# Patient Record
Sex: FEMALE | Race: WHITE | NOT HISPANIC OR LATINO | Employment: FULL TIME | URBAN - METROPOLITAN AREA
[De-identification: names, ages, dates, MRNs, and addresses within clinical notes are randomized per-mention and may not be internally consistent; named-entity substitution may affect disease eponyms.]

---

## 2017-04-19 ENCOUNTER — OFFICE VISIT (OUTPATIENT)
Dept: FAMILY MEDICINE | Facility: CLINIC | Age: 42
End: 2017-04-19
Payer: COMMERCIAL

## 2017-04-19 VITALS
OXYGEN SATURATION: 99 % | DIASTOLIC BLOOD PRESSURE: 68 MMHG | HEIGHT: 65 IN | WEIGHT: 116.38 LBS | TEMPERATURE: 98 F | BODY MASS INDEX: 19.39 KG/M2 | SYSTOLIC BLOOD PRESSURE: 110 MMHG | HEART RATE: 65 BPM

## 2017-04-19 DIAGNOSIS — J30.89 NON-SEASONAL ALLERGIC RHINITIS DUE TO OTHER ALLERGIC TRIGGER: Primary | ICD-10-CM

## 2017-04-19 PROCEDURE — 99213 OFFICE O/P EST LOW 20 MIN: CPT | Mod: S$GLB,,, | Performed by: NURSE PRACTITIONER

## 2017-04-19 PROCEDURE — 1160F RVW MEDS BY RX/DR IN RCRD: CPT | Mod: S$GLB,,, | Performed by: NURSE PRACTITIONER

## 2017-04-19 PROCEDURE — 96372 THER/PROPH/DIAG INJ SC/IM: CPT | Mod: S$GLB,,, | Performed by: NURSE PRACTITIONER

## 2017-04-19 RX ORDER — TRIAMCINOLONE ACETONIDE 40 MG/ML
80 INJECTION, SUSPENSION INTRA-ARTICULAR; INTRAMUSCULAR
Status: COMPLETED | OUTPATIENT
Start: 2017-04-19 | End: 2017-04-19

## 2017-04-19 RX ADMIN — TRIAMCINOLONE ACETONIDE 80 MG: 40 INJECTION, SUSPENSION INTRA-ARTICULAR; INTRAMUSCULAR at 01:04

## 2017-04-19 NOTE — PROGRESS NOTES
Subjective:       Patient ID: Geraldine Wynne is a 41 y.o. female.    Chief Complaint: Chest Congestion and Cough    Cough   This is a new problem. The current episode started 1 to 4 weeks ago. The problem has been unchanged. The problem occurs every few minutes. The cough is productive of sputum. Associated symptoms include shortness of breath. Pertinent negatives include no chest pain, chills, ear congestion, ear pain, fever, headaches, hemoptysis, myalgias, nasal congestion, postnasal drip, rash, rhinorrhea, sore throat, sweats, weight loss or wheezing. Nothing aggravates the symptoms. She has tried rest for the symptoms. The treatment provided no relief.     Review of Systems   Constitutional: Negative for chills, fever and weight loss.   HENT: Negative for congestion, ear pain, postnasal drip, rhinorrhea, sinus pressure and sore throat.    Respiratory: Positive for cough, chest tightness and shortness of breath. Negative for hemoptysis and wheezing.    Cardiovascular: Negative for chest pain.   Gastrointestinal: Negative for constipation, diarrhea, nausea and vomiting.   Genitourinary: Negative for difficulty urinating and dysuria.   Musculoskeletal: Negative for myalgias.   Skin: Negative for rash.   Neurological: Negative for light-headedness and headaches.       Objective:      Physical Exam   Constitutional: She is oriented to person, place, and time. She appears well-developed and well-nourished.   HENT:   Head: Normocephalic and atraumatic.   Right Ear: External ear normal.   Left Ear: External ear normal.   Nose: Mucosal edema and rhinorrhea present.   Mouth/Throat: Oropharynx is clear and moist.   Neck: Neck supple.   Cardiovascular: Normal rate and regular rhythm.    Pulmonary/Chest: Effort normal and breath sounds normal.   Lymphadenopathy:     She has no cervical adenopathy.   Neurological: She is alert and oriented to person, place, and time.   Skin: Skin is warm and dry.   Psychiatric: She has a  normal mood and affect. Her behavior is normal. Judgment and thought content normal.   Vitals reviewed.      Assessment:       1. Non-seasonal allergic rhinitis due to other allergic trigger        Plan:       Non-seasonal allergic rhinitis due to other allergic trigger  -     triamcinolone acetonide injection 80 mg; Inject 2 mLs (80 mg total) into the muscle one time.    Regular Mucinex twice a day  Increase H2O intake

## 2017-04-19 NOTE — MR AVS SNAPSHOT
"    [facility]  [address] [place] [id number]  Phone: [phone]  Fax: [phone]                  Geraldine Wynne   2017 1:00 PM   Office Visit    Description:  Female : 1975   Provider:  Rocio Leary NP   Department:  [facility]           Reason for Visit     Chest Congestion     Cough           Diagnoses this Visit        Comments    Non-seasonal allergic rhinitis due to other allergic trigger    -  Primary            To Do List           Goals (5 Years of Data)     None      [facility]     [facility] On Call Nurse Care Line -  Assistance  Unless otherwise directed by your provider, please contact [facility], our nurse care line that is available for  assistance.     Registered nurses in the [facility] On Call Center provide: appointment scheduling, clinical advisement, health education, and other advisory services.  Call: [phone] (toll free)               Medications           Message regarding Medications     Verify the changes and/or additions to your medication regime listed below are the same as discussed with your clinician today.  If any of these changes or additions are incorrect, please notify your healthcare provider.        These medications were administered today        Dose Freq    triamcinolone acetonide injection 80 mg 80 mg Clinic/HOD 1 time    Sig: Inject 2 mLs (80 mg total) into the muscle one time.    Class: Normal    Route: Intramuscular           Verify that the below list of medications is an accurate representation of the medications you are currently taking.  If none reported, the list may be blank. If incorrect, please contact your healthcare provider. Carry this list with you in case of emergency.                Clinical Reference Information           Your Vitals Were     BP Pulse Temp Height Weight SpO2    110/68 65 98.3 °F (36.8 °C) (Oral) 5' 5" (1.651 m) 52.8 kg (116 lb 6.5 oz) 99%    BMI                " 19.37 kg/m2          Blood Pressure          Most Recent Value    BP  110/68      Allergies as of 4/19/2017     No Known Allergies      Immunizations Administered on Date of Encounter - 4/19/2017     None      Administrations This Visit     triamcinolone acetonide injection 80 mg     Admin Date Action Dose Route Administered By             04/19/2017 Given 80 mg Intramuscular Abilio Davila LPN                      Language Assistance Services     ATTENTION: Language assistance services are available, free of charge. Please call [phone].      ATENCIÓN: Si eleno mina, tiene a hunt disposición servicios gratuitos de asistencia lingüística. Llame al [phone].     CHÚ Ý: N?u b?n nói Ti?ng Vi?t, có các d?ch v? h? tr? ngôn ng? mi?n phí dành cho b?n. G?i s? [phone].         [facility] complies with applicable Federal civil rights laws and does not discriminate on the basis of race, color, national origin, age, disability, or sex.

## 2017-05-31 ENCOUNTER — OFFICE VISIT (OUTPATIENT)
Dept: DERMATOLOGY | Facility: CLINIC | Age: 42
End: 2017-05-31
Payer: COMMERCIAL

## 2017-05-31 DIAGNOSIS — L23.9 ALLERGIC CONTACT DERMATITIS, UNSPECIFIED TRIGGER: Primary | ICD-10-CM

## 2017-05-31 PROCEDURE — 99999 PR PBB SHADOW E&M-EST. PATIENT-LVL II: CPT | Mod: PBBFAC,,, | Performed by: DERMATOLOGY

## 2017-05-31 PROCEDURE — 99203 OFFICE O/P NEW LOW 30 MIN: CPT | Mod: S$GLB,,, | Performed by: DERMATOLOGY

## 2017-05-31 RX ORDER — DESOXIMETASONE 2.5 MG/ML
SPRAY TOPICAL
Qty: 100 ML | Refills: 3 | Status: SHIPPED | OUTPATIENT
Start: 2017-05-31 | End: 2017-06-26 | Stop reason: SDUPTHER

## 2017-05-31 NOTE — PROGRESS NOTES
Subjective:       Patient ID:  Geraldine Wynne is a 41 y.o. female who presents for   Chief Complaint   Patient presents with    Rash     Pt presents for rash that started Saturday on right knee and left elbow.  Spread to left hip left arm, left ankle. slightly itchy. Made worse by scratching.  tx with OTC lotromin cream.  No change.   Now has rash on left hip.  Has never had this rash before. Pt was on a cruise in [place] and came home Monday may 22 and rash started may 27.  Pt using homemade soap from her relative.        Rash         Review of Systems   Constitutional: Negative for fever, chills, fatigue and malaise.   HENT: Negative for lip swelling and tongue swelling.    Skin: Positive for itching and rash.   Allergic/Immunologic: Negative for environmental allergies.        Objective:    Physical Exam   Skin:   Areas Examined (abnormalities noted in diagram):   Head / Face Inspection Performed  Neck Inspection Performed  Chest / Axilla Inspection Performed  Abdomen Inspection Performed  Back Inspection Performed  RUE Inspected  LUE Inspection Performed  RLE Inspected  LLE Inspection Performed  Nails and Digits Inspection Performed              Diagram Legend     Erythematous scaling macule/papule c/w actinic keratosis       Vascular papule c/w angioma      Pigmented verrucoid papule/plaque c/w seborrheic keratosis      Yellow umbilicated papule c/w sebaceous hyperplasia      Irregularly shaped tan macule c/w lentigo     1-2 mm smooth white papules consistent with Milia      Movable subcutaneous cyst with punctum c/w epidermal inclusion cyst      Subcutaneous movable cyst c/w pilar cyst      Firm pink to brown papule c/w dermatofibroma      Pedunculated fleshy papule(s) c/w skin tag(s)      Evenly pigmented macule c/w junctional nevus     Mildly variegated pigmented, slightly irregular-bordered macule c/w mildly atypical nevus      Flesh colored to evenly pigmented papule c/w intradermal nevus       Pink pearly  papule/plaque c/w basal cell carcinoma      Erythematous hyperkeratotic cursted plaque c/w SCC      Surgical scar with no sign of skin cancer recurrence      Open and closed comedones      Inflammatory papules and pustules      Verrucoid papule consistent consistent with wart     Erythematous eczematous patches and plaques     Dystrophic onycholytic nail with subungual debris c/w onychomycosis     Umbilicated papule    Erythematous-base heme-crusted tan verrucoid plaque consistent with inflamed seborrheic keratosis     Erythematous Silvery Scaling Plaque c/w Psoriasis     See annotation      Assessment / Plan:        Allergic contact dermatitis, unspecified trigger  Wash dog and have dog avoid running outside  Topicort spray   If pt rash progresses will do systemic pred taper starting at 40 mg   Pt deferred oral pred today   Other orders  -     TOPICORT 0.25 % Spry; AAA bid  Dispense: 100 mL; Refill: 3             Return if symptoms worsen or fail to improve.

## 2017-06-08 ENCOUNTER — TELEPHONE (OUTPATIENT)
Dept: DERMATOLOGY | Facility: CLINIC | Age: 42
End: 2017-06-08

## 2017-06-08 DIAGNOSIS — L23.9 ALLERGIC CONTACT DERMATITIS, UNSPECIFIED TRIGGER: Primary | ICD-10-CM

## 2017-06-08 RX ORDER — PREDNISONE 10 MG/1
TABLET ORAL
Qty: 35 TABLET | Refills: 0 | Status: SHIPPED | OUTPATIENT
Start: 2017-06-08 | End: 2017-06-26

## 2017-06-08 NOTE — TELEPHONE ENCOUNTER
----- Message from Amalia Hill LPN sent at 6/8/2017  9:58 AM CDT -----  Contact: [phone]      ----- Message -----  From: Alexandr Camacho  Sent: 6/8/2017   8:57 AM  To: Cinthia BROWN Staff    Pt was seen with provider on 5/31 and told to call provider to request an oral medication prescription for rash, if the topical is not working. Pt called to inform that topical is not helping her condition and would like the oral rx to be sent to her local [facility] pharmacy as soon as possible.       Prednisone taper called in . Pt notified that medication  May cause anxiety, sleep disturbance, hunger,. Increase blood pressure.

## 2017-06-26 ENCOUNTER — OFFICE VISIT (OUTPATIENT)
Dept: FAMILY MEDICINE | Facility: CLINIC | Age: 42
End: 2017-06-26
Payer: COMMERCIAL

## 2017-06-26 VITALS
TEMPERATURE: 99 F | SYSTOLIC BLOOD PRESSURE: 110 MMHG | HEART RATE: 76 BPM | RESPIRATION RATE: 15 BRPM | OXYGEN SATURATION: 98 % | DIASTOLIC BLOOD PRESSURE: 72 MMHG | WEIGHT: 115.06 LBS | BODY MASS INDEX: 19.15 KG/M2

## 2017-06-26 DIAGNOSIS — L23.9 ALLERGIC DERMATITIS: Primary | ICD-10-CM

## 2017-06-26 DIAGNOSIS — L23.0 ALLERGIC CONTACT DERMATITIS DUE TO METALS: Primary | ICD-10-CM

## 2017-06-26 PROCEDURE — 99213 OFFICE O/P EST LOW 20 MIN: CPT | Mod: 25,S$GLB,, | Performed by: NURSE PRACTITIONER

## 2017-06-26 PROCEDURE — 96372 THER/PROPH/DIAG INJ SC/IM: CPT | Mod: S$GLB,,, | Performed by: NURSE PRACTITIONER

## 2017-06-26 RX ORDER — BETAMETHASONE SODIUM PHOSPHATE AND BETAMETHASONE ACETATE 3; 3 MG/ML; MG/ML
12 INJECTION, SUSPENSION INTRA-ARTICULAR; INTRALESIONAL; INTRAMUSCULAR; SOFT TISSUE
Status: COMPLETED | OUTPATIENT
Start: 2017-06-26 | End: 2017-06-26

## 2017-06-26 RX ADMIN — BETAMETHASONE SODIUM PHOSPHATE AND BETAMETHASONE ACETATE 12 MG: 3; 3 INJECTION, SUSPENSION INTRA-ARTICULAR; INTRALESIONAL; INTRAMUSCULAR; SOFT TISSUE at 04:06

## 2017-06-26 NOTE — PROGRESS NOTES
Subjective:       Patient ID: Geraldine Wynne is a 41 y.o. female.    Chief Complaint: Rash    Rash   This is a recurrent (she saw the dermatolgist ) problem. The current episode started in the past 7 days. The problem is unchanged. Location: truck, back, legs, arms. The rash is characterized by itchiness and redness. She was exposed to nothing (did just start drinking beer). Pertinent negatives include no anorexia, congestion, cough, diarrhea, eye pain, facial edema, fatigue, fever, joint pain, nail changes, rhinorrhea, shortness of breath, sore throat or vomiting. Treatments tried: predinsone, topicort, benadryl. The treatment provided mild relief. There is no history of allergies, asthma, eczema or varicella.     Review of Systems   Constitutional: Negative for fatigue and fever.   HENT: Negative for congestion, rhinorrhea and sore throat.    Eyes: Negative for pain.   Respiratory: Negative for cough and shortness of breath.    Gastrointestinal: Negative for anorexia, diarrhea and vomiting.   Musculoskeletal: Negative for joint pain.   Skin: Positive for rash. Negative for nail changes.       Objective:      Physical Exam   Constitutional: She is oriented to person, place, and time. She appears well-developed and well-nourished. No distress.   HENT:   Right Ear: External ear normal.   Left Ear: External ear normal.   Cardiovascular: Normal rate, regular rhythm and normal heart sounds.    Pulmonary/Chest: Effort normal and breath sounds normal.   Neurological: She is alert and oriented to person, place, and time.   Skin: Skin is warm and dry. Rash noted. Rash is papular. She is not diaphoretic. No erythema. No pallor.   Papular rash noted to arms, legs, abdomen and back      Psychiatric: She has a normal mood and affect. Her behavior is normal. Judgment and thought content normal.       Assessment:       1. Allergic dermatitis        Plan:       Allergic dermatitis    Other orders  -     betamethasone  acetate-betamethasone sodium phosphate injection 12 mg; Inject 2 mLs (12 mg total) into the muscle one time.      benadryl at night

## 2017-06-28 RX ORDER — DESOXIMETASONE 2.5 MG/ML
SPRAY TOPICAL
Qty: 100 ML | Refills: 3 | Status: SHIPPED | OUTPATIENT
Start: 2017-06-28 | End: 2018-04-25 | Stop reason: ALTCHOICE

## 2017-08-23 ENCOUNTER — TELEPHONE (OUTPATIENT)
Dept: FAMILY MEDICINE | Facility: CLINIC | Age: 42
End: 2017-08-23

## 2017-08-23 RX ORDER — FLUCONAZOLE 150 MG/1
TABLET ORAL
Qty: 2 TABLET | Refills: 0 | Status: SHIPPED | OUTPATIENT
Start: 2017-08-23 | End: 2017-10-09 | Stop reason: SDUPTHER

## 2017-08-23 NOTE — TELEPHONE ENCOUNTER
----- Message from Ada Santacruz sent at 8/23/2017 11:19 AM CDT -----  Rocio patient  Patient experiencing yeast infection. Started yesterday. Not taking any OTC medication  Would like something called into [facility]

## 2017-09-26 ENCOUNTER — OFFICE VISIT (OUTPATIENT)
Dept: FAMILY MEDICINE | Facility: CLINIC | Age: 42
End: 2017-09-26
Payer: COMMERCIAL

## 2017-09-26 VITALS
BODY MASS INDEX: 19.54 KG/M2 | WEIGHT: 117.38 LBS | RESPIRATION RATE: 15 BRPM | DIASTOLIC BLOOD PRESSURE: 73 MMHG | SYSTOLIC BLOOD PRESSURE: 110 MMHG | TEMPERATURE: 99 F | HEART RATE: 59 BPM | OXYGEN SATURATION: 98 %

## 2017-09-26 DIAGNOSIS — Z80.0 FAMILY HISTORY OF COLON CANCER: ICD-10-CM

## 2017-09-26 DIAGNOSIS — M62.838 MUSCLE SPASM: ICD-10-CM

## 2017-09-26 DIAGNOSIS — M54.2 NECK PAIN: Primary | ICD-10-CM

## 2017-09-26 DIAGNOSIS — K59.09 CHRONIC CONSTIPATION: ICD-10-CM

## 2017-09-26 PROCEDURE — 99214 OFFICE O/P EST MOD 30 MIN: CPT | Mod: 25,S$GLB,, | Performed by: NURSE PRACTITIONER

## 2017-09-26 PROCEDURE — 3008F BODY MASS INDEX DOCD: CPT | Mod: S$GLB,,, | Performed by: NURSE PRACTITIONER

## 2017-09-26 PROCEDURE — 96372 THER/PROPH/DIAG INJ SC/IM: CPT | Mod: S$GLB,,, | Performed by: NURSE PRACTITIONER

## 2017-09-26 RX ORDER — TRIAMCINOLONE ACETONIDE 40 MG/ML
80 INJECTION, SUSPENSION INTRA-ARTICULAR; INTRAMUSCULAR
Status: COMPLETED | OUTPATIENT
Start: 2017-09-26 | End: 2017-09-26

## 2017-09-26 RX ORDER — CYCLOBENZAPRINE HCL 10 MG
10 TABLET ORAL 3 TIMES DAILY PRN
Qty: 30 TABLET | Refills: 0 | Status: SHIPPED | OUTPATIENT
Start: 2017-09-26 | End: 2017-10-06

## 2017-09-26 RX ADMIN — TRIAMCINOLONE ACETONIDE 80 MG: 40 INJECTION, SUSPENSION INTRA-ARTICULAR; INTRAMUSCULAR at 03:09

## 2017-09-26 NOTE — PROGRESS NOTES
Subjective:       Patient ID: Geraldine Wynne is a 41 y.o. female.    Chief Complaint: Constipation and Shoulder Pain    Constipation   This is a chronic problem. The current episode started more than 1 year ago. The problem is unchanged. Her stool frequency is 1 time per week or less (will go every 2 weeks if she is kaylynn). Stool description: hard. The patient is on a high fiber diet. She exercises regularly. There has not been adequate water intake. Associated symptoms include hemorrhoids. Pertinent negatives include no abdominal pain, anorexia, back pain, bloating, diarrhea, difficulty urinating, fecal incontinence, fever, flatus, hematochezia, melena, nausea, rectal pain, vomiting or weight loss. Risk factors: family history of colon cancer-mother  in her 50's. Treatments tried: water enemas, miralex, laxative. The treatment provided no relief. There is no history of abdominal surgery, endocrine disease, inflammatory bowel disease, irritable bowel syndrome, metabolic disease, neurologic disease, neuromuscular disease, psychiatric history or radiation treatment.   Shoulder Pain    The pain is present in the neck (bilateral). This is a chronic (has been hurting for about 3 weeks states she wears the stress in her neck and has been stressed lately) problem. The current episode started 1 to 4 weeks ago. There has been no history of extremity trauma. The problem occurs constantly. The problem has been unchanged. The pain is at a severity of 8/10. Pertinent negatives include no fever, headaches, inability to bear weight, itching, joint locking, joint swelling, limited range of motion, numbness, stiffness, tingling or visual symptoms. The symptoms are aggravated by activity. Treatments tried: some pills that her friend had but she is unable to recall the name of them, massages, NSAIDS. The treatment provided mild relief. Family history does not include arthritis. There is no history of diabetes, Injuries to Extremity  or migraines.     Review of Systems   Constitutional: Negative for chills, diaphoresis, fatigue, fever and weight loss.   Eyes: Negative for photophobia and visual disturbance.   Respiratory: Negative for cough, chest tightness, shortness of breath and wheezing.    Cardiovascular: Negative for chest pain, palpitations and leg swelling.   Gastrointestinal: Positive for constipation and hemorrhoids. Negative for abdominal pain, anorexia, bloating, diarrhea, flatus, hematochezia, melena, nausea, rectal pain and vomiting.   Genitourinary: Negative for difficulty urinating.   Musculoskeletal: Positive for neck pain. Negative for back pain and stiffness.        Bilateral neck pain     Skin: Negative for color change, itching, pallor, rash and wound.   Neurological: Negative for tingling, numbness and headaches.       Objective:      Physical Exam   Constitutional: She is oriented to person, place, and time. She appears well-developed and well-nourished. No distress.   HENT:   Right Ear: External ear normal.   Left Ear: External ear normal.   Cardiovascular: Normal rate, regular rhythm and normal heart sounds.    Pulmonary/Chest: Effort normal and breath sounds normal.   Abdominal: Soft. She exhibits no distension. Bowel sounds are decreased. There is no tenderness.   Musculoskeletal: Normal range of motion. She exhibits no edema, tenderness or deformity.        Cervical back: She exhibits pain.        Back:    Neurological: She is alert and oriented to person, place, and time.   Skin: Skin is warm and dry. She is not diaphoretic.   Psychiatric: She has a normal mood and affect. Her behavior is normal. Judgment and thought content normal.   Vitals reviewed.      Assessment:       1. Neck pain    2. Chronic constipation    3. Muscle spasm    4. Family history of colon cancer        Plan:       Neck pain  -     triamcinolone acetonide injection 80 mg; Inject 2 mLs (80 mg total) into the muscle one time.    Chronic  constipation  -     Ambulatory referral to Gastroenterology    Muscle spasm  -     cyclobenzaprine (FLEXERIL) 10 MG tablet; Take 1 tablet (10 mg total) by mouth 3 (three) times daily as needed for Muscle spasms.  Dispense: 30 tablet; Refill: 0    Family history of colon cancer    heat to neck  Continue massage therapy as needed

## 2017-09-28 ENCOUNTER — OFFICE VISIT (OUTPATIENT)
Dept: GASTROENTEROLOGY | Facility: CLINIC | Age: 42
End: 2017-09-28
Payer: COMMERCIAL

## 2017-09-28 VITALS
WEIGHT: 117.63 LBS | HEART RATE: 60 BPM | BODY MASS INDEX: 19.6 KG/M2 | DIASTOLIC BLOOD PRESSURE: 70 MMHG | SYSTOLIC BLOOD PRESSURE: 98 MMHG | HEIGHT: 65 IN

## 2017-09-28 DIAGNOSIS — R10.84 GENERALIZED ABDOMINAL PAIN: ICD-10-CM

## 2017-09-28 DIAGNOSIS — Z83.719 FAMILY HISTORY OF COLONIC POLYPS: ICD-10-CM

## 2017-09-28 DIAGNOSIS — K59.01 CONSTIPATION BY DELAYED COLONIC TRANSIT: Primary | ICD-10-CM

## 2017-09-28 PROCEDURE — 99244 OFF/OP CNSLTJ NEW/EST MOD 40: CPT | Mod: S$GLB,,, | Performed by: INTERNAL MEDICINE

## 2017-09-28 PROCEDURE — 99999 PR PBB SHADOW E&M-EST. PATIENT-LVL III: CPT | Mod: PBBFAC,,, | Performed by: INTERNAL MEDICINE

## 2017-09-28 RX ORDER — POLYETHYLENE GLYCOL 3350, SODIUM SULFATE ANHYDROUS, SODIUM BICARBONATE, SODIUM CHLORIDE, POTASSIUM CHLORIDE 236; 22.74; 6.74; 5.86; 2.97 G/4L; G/4L; G/4L; G/4L; G/4L
4 POWDER, FOR SOLUTION ORAL ONCE
Qty: 4000 ML | Refills: 0 | Status: SHIPPED | OUTPATIENT
Start: 2017-09-28 | End: 2017-09-28

## 2017-09-28 NOTE — PATIENT INSTRUCTIONS
Constipation (Adult)  Constipation means that you have bowel movements that are less frequent than usual. Stools often become very hard and difficult to pass.  Constipation is very common. At some point in life it affects almost everyone. Since everyone's bowel habits are different, what is constipation to one person may not be to another. Your healthcare provider may do tests to diagnose constipation. It depends on what he or she finds when evaluating you.    Symptoms of constipation include:  · Abdominal pain  · Bloating  · Vomiting  · Painful bowel movements  · Itching, swelling, bleeding, or pain around the anus  Causes  Constipation can have many causes. These include:  · Diet low in fiber  · Too much dairy  · Not drinking enough liquids  · Lack of exercise or physical activity. This is especially true for older adults.  · Changes in lifestyle or daily routine, including pregnancy, aging, work, and travel  · Frequent use or misuse of laxatives  · Ignoring the urge to have a bowel movement or delaying it until later  · Medicines, such as certain prescription pain medicines, iron supplements, antacids, certain antidepressants, and calcium supplements  · Diseases like irritable bowel syndrome, bowel obstructions, stroke, diabetes, thyroid disease, Parkinson disease, hemorrhoids, and colon cancer  Complications  Potential complications of constipation can include:  · Hemorrhoids  · Rectal bleeding from hemorrhoids or anal fissures (skin tears)  · Hernias  · Dependency on laxatives  · Chronic constipation  · Fecal impaction  · Bowel obstruction or perforation  Home care  All treatment should be done after talking with your healthcare provider. This is especially true if you have another medical problems, are taking prescription medicines, or are an older adult. Treatment most often involves lifestyle changes. You may also need medicines. Your healthcare provider will tell you which will work best for you. Follow  the advice below to help avoid this problem in the future.  Lifestyle changes  These lifestyle changes can help prevent constipation:  · Diet. Eat a high-fiber diet, with fresh fruit and vegetables, and reduce dairy intake, meats, and processed foods  · Fluids. It's important to get enough fluids each day. Drink plenty of water when you eat more fiber. If you are on diet that limits the amount of fluid you can have, talk about this with your healthcare provider.  · Regular exercise. Check with your healthcare provider first.  Medications  Take any medicines as directed. Some laxatives are safe to use only every now and then. Others can be taken on a regular basis. Talk with your doctor or pharmacist if you have questions.  Prescription pain medicines can cause constipation. If you are taking this kind of medicine, ask your healthcare provider if you should also take a stool softener.  Medicines you may take to treat constipation include:  · Fiber supplements  · Stool softeners  · Laxatives  · Enemas  · Rectal suppositories  Follow-up care  Follow up with your healthcare provider if symptoms don't get better in the next few days. You may need to have more tests or see a specialist.  Call 911  Call 911 if any of these occur:  · Trouble breathing  · Stiff, rigid abdomen that is severely painful to touch  · Confusion  · Fainting or loss of consciousness  · Rapid heart rate  · Chest pain  When to seek medical advice  Call your healthcare provider right away if any of these occur:  · Fever over 100.4°F (38°C)  · Failure to resume normal bowel movements  · Pain in your abdomen or back gets worse  · Nausea or vomiting  · Swelling in your abdomen  · Blood in the stool  · Black, tarry stool  · Involuntary weight loss  · Weakness  Date Last Reviewed: 12/30/2015  © 0209-2616 [employer]. [address] [place] [address], [place] [address]. All rights reserved. This information is not intended as a substitute for  professional medical care. Always follow your healthcare professional's instructions.

## 2017-09-28 NOTE — PROGRESS NOTES
Subjective:      Patient ID: Geraldine Wynne is a 41 y.o. female.    Chief Complaint: Abdominal Pain; Constipation; and Colonoscopy    HPI:    Patient 41-year-old female presenting for GI consult.  She gives a history of lifelong constipation.  Describes having a extensive workup in [facility] about 10 years ago.  Apparently that workup included colonoscopy, Sitzmarks study, defecography, other studies.  Patient indicates no definitive diagnosis was determined.  The tests were apparently normal.  Patient describes infrequent bowel movements.  Indicates she may go 2 weeks without a bowel movement.  This despite taking MiraLAX one or 2 doses per day and weekly citrate magnesia.  Supplements this with fleets enemas.  In recent months has begun taking 2-4 Senokot at least weekly.  I advised her to avoid the Senokot if at all possible.  She does not give a history of other stimulant laxative use.  Patient appears to be very sensitive to abdominal stimuli.  Past medical history appears to be otherwise relatively unremarkable.  Nonsmoker.  Alcohol occasional.  Her mother had colon polyps on multiple occasions in her early 50s  She  at 57 of an abdominal malignancy not related to the colon.    Review of patient's allergies indicates:  No Known Allergies  History reviewed. No pertinent past medical history.  History reviewed. No pertinent surgical history.  Family History   Problem Relation Age of Onset    No Known Problems Mother     No Known Problems Father      Social History     Social History    Marital status: Legally      Spouse name: N/A    Number of children: N/A    Years of education: N/A     Occupational History    Not on file.     Social History Main Topics    Smoking status: Never Smoker    Smokeless tobacco: Never Used    Alcohol use Yes      Comment: occ    Drug use: No    Sexual activity: Not on file     Other Topics Concern    Not on file     Social History Narrative    No narrative  "on file       Review of Systems:  Constitutional: Negative for appetite change.   HENT: Negative for trouble swallowing.   Eyes: Negative for photophobia.   Respiratory: Negative for cough and shortness of breath.   Cardiovascular: Negative for palpitations.   Gastrointestinal: See HPI for details.  Genitourinary: Negative for frequency and hematuria.   Skin: Negative for rash.   Neurological: Negative for weakness and headaches.   Hematological: Negative.   Psychiatric/Behavioral: Negative for suicidal ideas and behavioral problems.     Objective:     BP 98/70 (BP Location: Right arm, Patient Position: Sitting)   Pulse 60   Ht 5' 5" (1.651 m)   Wt 53.3 kg (117 lb 9.6 oz)   BMI 19.57 kg/m²     Physical Exam:  Eyes: Pupils are equal, round, and reactive to light.   Neck: Supple. No mass  Cardiovascular: Regular rhythm . No murmur   Pulmonary/Chest: Lungs clear   Abdominal: Soft. No mass palpated. Nontender, no guarding. Positive bowel sounds   Musculoskeletal: No deformity. No edema.   Psychiatric: Alert and oriented    Assessment:     1. Constipation by delayed colonic transit    2. Family history of colonic polyps    3. Generalized abdominal pain      Plan:     Geraldine was seen today for abdominal pain, constipation and colonoscopy.    Diagnoses and all orders for this visit:    Constipation by delayed colonic transit  -     linaclotide (LINZESS) 145 mcg Cap capsule; Take 1 capsule (145 mcg total) by mouth once daily.    Family history of colonic polyps    Generalized abdominal pain      Plan:  Linzess daily  MiraLAX twice a day  Colonoscopy, for polyp screening  Patient will need a Colyte prep with perhaps psychiatric magnesia prior to that.  Give her the option to address the prep as needed.    CC Dr. Leary    "

## 2017-09-29 ENCOUNTER — TELEPHONE (OUTPATIENT)
Dept: GASTROENTEROLOGY | Facility: CLINIC | Age: 42
End: 2017-09-29

## 2017-10-02 ENCOUNTER — TELEPHONE (OUTPATIENT)
Dept: GASTROENTEROLOGY | Facility: CLINIC | Age: 42
End: 2017-10-02

## 2017-10-02 NOTE — TELEPHONE ENCOUNTER
Patient called and wanted to schedule her Colonoscopy at [facility] on 10/17/17,prep instructions was given at office visit. Patient verbalize understanding.

## 2017-10-09 ENCOUNTER — TELEPHONE (OUTPATIENT)
Dept: FAMILY MEDICINE | Facility: CLINIC | Age: 42
End: 2017-10-09

## 2017-10-09 RX ORDER — FLUCONAZOLE 150 MG/1
TABLET ORAL
Qty: 2 TABLET | Refills: 0 | Status: SHIPPED | OUTPATIENT
Start: 2017-10-09 | End: 2018-03-21 | Stop reason: SDUPTHER

## 2017-10-09 NOTE — TELEPHONE ENCOUNTER
----- Message from Ada Santacruz sent at 10/9/2017  9:07 AM CDT -----  Patient says she saw you last week. Experiencing symptoms of yeast infection. Started on yesterday. Would like something called into [facility]

## 2017-10-12 ENCOUNTER — CLINICAL SUPPORT (OUTPATIENT)
Dept: OTHER | Facility: CLINIC | Age: 42
End: 2017-10-12
Payer: COMMERCIAL

## 2017-10-12 DIAGNOSIS — Z00.8 HEALTH EXAMINATION IN POPULATION SURVEYS: Primary | ICD-10-CM

## 2017-10-12 PROCEDURE — 99401 PREV MED CNSL INDIV APPRX 15: CPT | Mod: S$GLB,,, | Performed by: INTERNAL MEDICINE

## 2017-10-12 PROCEDURE — 82947 ASSAY GLUCOSE BLOOD QUANT: CPT | Mod: QW,S$GLB,, | Performed by: INTERNAL MEDICINE

## 2017-10-12 PROCEDURE — 80061 LIPID PANEL: CPT | Mod: QW,S$GLB,, | Performed by: INTERNAL MEDICINE

## 2017-10-13 VITALS
HEIGHT: 65 IN | WEIGHT: 114 LBS | SYSTOLIC BLOOD PRESSURE: 102 MMHG | BODY MASS INDEX: 18.99 KG/M2 | DIASTOLIC BLOOD PRESSURE: 68 MMHG

## 2017-10-13 LAB
GLUCOSE SERPL-MCNC: NORMAL MG/DL (ref 60–140)
POC CHOLESTEROL, HDL: 70 MG/DL (ref 40–?)
POC CHOLESTEROL, LDL: 93 MG/DL (ref ?–160)
POC CHOLESTEROL, TOTAL: 174 MG/DL (ref ?–240)
POC GLUCOSE FASTING: 79 MG/DL (ref 60–110)
POC TOTAL CHOLESTEROL / HDL RATIO: 2.5 (ref ?–6)
POC TRIGLYCERIDES: 51 MG/DL (ref ?–160)

## 2017-10-25 ENCOUNTER — TELEPHONE (OUTPATIENT)
Dept: GASTROENTEROLOGY | Facility: CLINIC | Age: 42
End: 2017-10-25

## 2017-10-25 NOTE — TELEPHONE ENCOUNTER
Patient was called and notified of Pathology results and a note was made in recall for follow-up Colonoscopy in 2 years for surveillance. [facility]rell [facility] Pathology report was scan in under media.

## 2018-01-31 ENCOUNTER — LAB VISIT (OUTPATIENT)
Dept: LAB | Facility: HOSPITAL | Age: 43
End: 2018-01-31
Attending: NURSE PRACTITIONER
Payer: COMMERCIAL

## 2018-01-31 ENCOUNTER — OFFICE VISIT (OUTPATIENT)
Dept: FAMILY MEDICINE | Facility: CLINIC | Age: 43
End: 2018-01-31
Payer: COMMERCIAL

## 2018-01-31 VITALS
BODY MASS INDEX: 19.47 KG/M2 | DIASTOLIC BLOOD PRESSURE: 60 MMHG | HEART RATE: 78 BPM | OXYGEN SATURATION: 98 % | RESPIRATION RATE: 15 BRPM | WEIGHT: 117 LBS | SYSTOLIC BLOOD PRESSURE: 95 MMHG | TEMPERATURE: 99 F

## 2018-01-31 DIAGNOSIS — J40 BRONCHITIS: ICD-10-CM

## 2018-01-31 DIAGNOSIS — R06.02 SOB (SHORTNESS OF BREATH): ICD-10-CM

## 2018-01-31 DIAGNOSIS — R53.83 FATIGUE, UNSPECIFIED TYPE: Primary | ICD-10-CM

## 2018-01-31 DIAGNOSIS — R53.83 FATIGUE, UNSPECIFIED TYPE: ICD-10-CM

## 2018-01-31 DIAGNOSIS — R05.9 COUGH: ICD-10-CM

## 2018-01-31 LAB — HETEROPH AB SERPL QL IA: NEGATIVE

## 2018-01-31 PROCEDURE — 86308 HETEROPHILE ANTIBODY SCREEN: CPT

## 2018-01-31 PROCEDURE — 99213 OFFICE O/P EST LOW 20 MIN: CPT | Mod: S$GLB,,, | Performed by: NURSE PRACTITIONER

## 2018-01-31 PROCEDURE — 36415 COLL VENOUS BLD VENIPUNCTURE: CPT | Mod: PO

## 2018-01-31 PROCEDURE — 3008F BODY MASS INDEX DOCD: CPT | Mod: S$GLB,,, | Performed by: NURSE PRACTITIONER

## 2018-01-31 RX ORDER — AZITHROMYCIN 250 MG/1
250 TABLET, FILM COATED ORAL DAILY
Qty: 6 TABLET | Refills: 0 | Status: SHIPPED | OUTPATIENT
Start: 2018-01-31 | End: 2018-02-05

## 2018-01-31 NOTE — PROGRESS NOTES
Subjective:       Patient ID: Geraldine Wynne is a 42 y.o. female.    Chief Complaint: Cough    Cough   This is a new problem. The current episode started 1 to 4 weeks ago. The problem has been unchanged. The problem occurs every few minutes. The cough is productive of sputum. Associated symptoms include shortness of breath and wheezing. Pertinent negatives include no chest pain, chills, ear congestion, ear pain, fever, headaches, heartburn, hemoptysis, myalgias, nasal congestion, postnasal drip, rash, rhinorrhea, sore throat, sweats or weight loss. Treatments tried: mucinex. The treatment provided no relief. There is no history of asthma, bronchiectasis, bronchitis, COPD, emphysema, environmental allergies or pneumonia.     Review of Systems   Constitutional: Positive for fatigue. Negative for chills, diaphoresis, fever and weight loss.   HENT: Negative for ear pain, postnasal drip, rhinorrhea and sore throat.    Respiratory: Positive for cough, chest tightness, shortness of breath and wheezing. Negative for apnea and hemoptysis.    Cardiovascular: Negative for chest pain, palpitations and leg swelling.   Gastrointestinal: Negative for heartburn.   Musculoskeletal: Negative for myalgias.   Skin: Negative for rash.   Allergic/Immunologic: Negative for environmental allergies.   Neurological: Negative for headaches.       Objective:      Physical Exam   Constitutional: She is oriented to person, place, and time. She appears well-developed and well-nourished. No distress.   HENT:   Right Ear: External ear normal.   Left Ear: External ear normal.   Cardiovascular: Normal rate, regular rhythm and normal heart sounds.    Pulmonary/Chest: Effort normal. She has wheezes.   Neurological: She is alert and oriented to person, place, and time.   Skin: Skin is warm and dry. No rash noted. She is not diaphoretic. No erythema. No pallor.   Psychiatric: She has a normal mood and affect. Her behavior is normal. Judgment and thought  content normal.   Vitals reviewed.      Assessment:       1. Fatigue, unspecified type    2. SOB (shortness of breath)    3. Cough    4. Bronchitis        Plan:       Fatigue, unspecified type  -     HETEROPHILE AB SCREEN; Future    SOB (shortness of breath)  -     azithromycin (Z-ADY) 250 MG tablet; Take 1 tablet (250 mg total) by mouth once daily.  Dispense: 6 tablet; Refill: 0    Cough  -     azithromycin (Z-ADY) 250 MG tablet; Take 1 tablet (250 mg total) by mouth once daily.  Dispense: 6 tablet; Refill: 0    Bronchitis      Would like to get tested for mono

## 2018-02-01 ENCOUNTER — PATIENT MESSAGE (OUTPATIENT)
Dept: FAMILY MEDICINE | Facility: CLINIC | Age: 43
End: 2018-02-01

## 2018-02-07 ENCOUNTER — PATIENT MESSAGE (OUTPATIENT)
Dept: ADMINISTRATIVE | Facility: HOSPITAL | Age: 43
End: 2018-02-07

## 2018-03-09 ENCOUNTER — PATIENT MESSAGE (OUTPATIENT)
Dept: FAMILY MEDICINE | Facility: CLINIC | Age: 43
End: 2018-03-09

## 2018-03-21 ENCOUNTER — TELEPHONE (OUTPATIENT)
Dept: FAMILY MEDICINE | Facility: CLINIC | Age: 43
End: 2018-03-21

## 2018-03-21 RX ORDER — FLUCONAZOLE 150 MG/1
TABLET ORAL
Qty: 2 TABLET | Refills: 0 | Status: SHIPPED | OUTPATIENT
Start: 2018-03-21 | End: 2018-04-25 | Stop reason: ALTCHOICE

## 2018-03-21 NOTE — TELEPHONE ENCOUNTER
----- Message from Ada Santacruz sent at 3/21/2018  2:33 PM CDT -----  Contact: cell [phone]  Patient was prescribed medication for yeast infection. Says Rx came with two pills. She went to open pack & pill went flying. She can't find it. Has looked everywhere. She went ahead & took the other pill. Just doesn't know if you can call in Rx for just that one pill?

## 2018-03-21 NOTE — TELEPHONE ENCOUNTER
One pill is fine she wasn't supposed to repeat the second pill unless she stll had symptoms 48 hours after

## 2018-03-21 NOTE — TELEPHONE ENCOUNTER
----- Message from Viviana Cobb sent at 3/21/2018 10:16 AM CDT -----  Contact: the pt  Pt has a yeast infection and would like to get something called in to the pharmacy.. [facility].  Any questions, call her at [phone]

## 2018-04-25 ENCOUNTER — OFFICE VISIT (OUTPATIENT)
Dept: FAMILY MEDICINE | Facility: CLINIC | Age: 43
End: 2018-04-25
Payer: COMMERCIAL

## 2018-04-25 VITALS
TEMPERATURE: 99 F | HEART RATE: 57 BPM | OXYGEN SATURATION: 100 % | DIASTOLIC BLOOD PRESSURE: 60 MMHG | SYSTOLIC BLOOD PRESSURE: 90 MMHG | HEIGHT: 65 IN | WEIGHT: 119.94 LBS | BODY MASS INDEX: 19.98 KG/M2

## 2018-04-25 DIAGNOSIS — M54.2 NECK PAIN: Primary | ICD-10-CM

## 2018-04-25 PROCEDURE — 99213 OFFICE O/P EST LOW 20 MIN: CPT | Mod: S$GLB,,, | Performed by: NURSE PRACTITIONER

## 2018-04-25 NOTE — PROGRESS NOTES
Subjective:       Patient ID: Geraldine Wynne is a 42 y.o. female.    Chief Complaint: Neck Pain    Neck Pain    This is a chronic problem. The current episode started more than 1 year ago. The problem occurs intermittently (most days). The problem has been unchanged. The pain is present in the left side. The quality of the pain is described as aching. The pain is at a severity of 7/10. The pain is moderate. Nothing aggravates the symptoms. Stiffness is present all day. Pertinent negatives include no chest pain, fever, headaches, leg pain, numbness, pain with swallowing, paresis, photophobia, syncope, tingling, trouble swallowing, visual change, weakness or weight loss. Treatments tried: chiopracther, massage, muscle relaxers. The treatment provided no relief.     Review of Systems   Constitutional: Negative for chills, diaphoresis, fatigue, fever and weight loss.   HENT: Negative for trouble swallowing.    Eyes: Negative for photophobia.   Respiratory: Negative for cough, chest tightness, shortness of breath and wheezing.    Cardiovascular: Negative for chest pain, palpitations, leg swelling and syncope.   Musculoskeletal: Positive for neck pain.        Right sided neck pain     Skin: Negative for color change, pallor, rash and wound.   Neurological: Negative for tingling, weakness, numbness and headaches.       Objective:      Physical Exam   Constitutional: She appears well-developed and well-nourished. No distress.   HENT:   Right Ear: External ear normal.   Left Ear: External ear normal.   Cardiovascular: Normal rate, regular rhythm and normal heart sounds.    Pulmonary/Chest: Effort normal and breath sounds normal. No respiratory distress. She has no wheezes.   Musculoskeletal: She exhibits no edema or deformity.        Cervical back: She exhibits decreased range of motion and tenderness.        Back:    Neurological: She is alert.   Skin: Skin is warm and dry. She is not diaphoretic.   Psychiatric: She has a  normal mood and affect. Her behavior is normal. Judgment and thought content normal.   Vitals reviewed.      Assessment:       1. Neck pain        Plan:       Neck pain  -     Ambulatory Referral to Physical/Occupational Therapy

## 2018-04-27 ENCOUNTER — PATIENT MESSAGE (OUTPATIENT)
Dept: ADMINISTRATIVE | Facility: HOSPITAL | Age: 43
End: 2018-04-27

## 2018-05-02 ENCOUNTER — CLINICAL SUPPORT (OUTPATIENT)
Dept: REHABILITATION | Facility: HOSPITAL | Age: 43
End: 2018-05-02
Payer: COMMERCIAL

## 2018-05-02 DIAGNOSIS — R29.898 WEAKNESS OF BOTH UPPER EXTREMITIES: ICD-10-CM

## 2018-05-02 DIAGNOSIS — R29.898 DECREASED RANGE OF MOTION OF NECK: ICD-10-CM

## 2018-05-02 DIAGNOSIS — R29.3 POOR POSTURE: ICD-10-CM

## 2018-05-02 PROCEDURE — 97162 PT EVAL MOD COMPLEX 30 MIN: CPT | Mod: PO

## 2018-05-02 PROCEDURE — 97110 THERAPEUTIC EXERCISES: CPT | Mod: PO

## 2018-05-02 NOTE — PLAN OF CARE
TIME RECORD    Date: 5/2/2018    Start Time:  3:00  Stop Time:  4:00    PROCEDURES:    TIMED  Procedure Min.   TE 10                     UNTIMED  Procedure Min.   PT eval 50         Total Timed Minutes:  10  Total Timed Units:  1  Total Untimed Units:  1  Charges Billed/# of units:  2    [facility]    PHYSICAL THERAPY EVALUATION  Onset Date: 11 years ago  Medical Diagnosis: decreased ROM, weakness, poor posture  Treatment Diagnosis:   1. Decreased range of motion of neck     2. Weakness of both upper extremities     3. Poor posture       Physician: Rocio Leary, NP  No past medical history on file.  Precautions: Standard  Prior Therapy: No PT, but chiropractor and massage therapy  Medications: Geraldine Wynne currently has no medications in their medication list.  Nutrition:  Normal  History of Present Illness: chronic neck pain  Prior Level of Function: Independent  Social History: Lives with two daughters  Place of Residence (Steps/Adaptations): Single story home  Functional Deficits Leading to Referral/Nature of Injury: Difficulty with running, getting comfortable to sleep, driving, gardening, doing household projects, at times when restraining children at work  Patient Therapy Goals: Relieve tight neck muscle. Help it to relax    Subjective     Geraldine Wynne states she has had a knot in her neck for 11 years or more. In the past she has done massage, chiropractor, a number of different things, but has only gotten temporary relief from muscle relaxers. She has a couple of friends who are physical therapists that suggested she try dry needling. She reports having increased pain with trying to get comfortable to sleep; she prefers to sleep on her side and she has tried several different types of pillows. She is right handed but usually carries her shoulder bag on her L side. 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 When doing both of those activities she gets increased pain on the L side of her neck. When she runs for exercise 4-5x week, 3-4 miles at a time she gets increased pain on the left side. She has not been getting headaches but does have increased tenderness to the back of her head. She has noticed increased pain with cold weather and when her stress level increases. She also has increased pain on the left side when driving and looking to the left. She als has increased pain when working in her garden and when doing household projects.     Pain:  Location: neck  and L UT   Description: Tight and so tight it just won't relax, it is a knot  Activities Which Increase Pain: Bending, Lifting and moving head, stress  Activities Which Decrease Pain: massage and stretching  Pain Scale: 4/10 at best 4/10 now  6/10 at worst    Objective     Posture: forward shoulders and head in sitting and standing  Palpation: TTP L UT, L clavicle, Lparaspinals, L suboccipital  Sensation: light touch intact  DTRs: intact  Range of Motion/Strength:   Cervical AROM: Pain/Dysfunction with Movement:   Flexion 45 Pulling L UT   Extension 45 Pulling L UT   Right side bending 40 Pulling L UT   Left side bending 20 "hurts" L UT   Right rotation 90 Pulling L UT   Left rotation 60 "Hurt" L UT       U/E MMT Right Left Pain/Dysfunction with Movement   Shld Flexion 5/5 4/5    Shld Extension 5/5 5/5    Shld Abduction 5/5 4/5    Shld IR 5/5 4/5 Pain on L   Shld ER 5/5 5/5    Elbow Flexion 5/5 5/5    Elbow Extension 5/5 5/5    Middle Trap 3/5 3/5    Lower Trap 3/5 3/5      Flexibility: B pec major tightness  Gait: Without AD  Analysis: Assistance none, unremarkable  Bed Mobility:Independent  Transfers: Independent  Special Tests: Spurling's Negative, Sharp Mychal negative  Other: cervical upslide and downslide WNL     Functional Limitation Reports: G codes  Tool: FOTO Neck SURVEY  Category: Carrying ()  Limitation: 41%  Current: CK = at least " 40% but < 60% impaired, limited or restricted  Goal: CI = at least 1% but < 20% impaired, limited or restricted    TREATMENT     Time In: 3:50  Time Out: 4:00    PT Evaluation Completed? Yes  Discussed Plan of Care with patient: Yes    Geraldine received 10 minutes of therapeutic exercise & instruction including:  Sitting posture on computer and with driving, UT stretch, and levator stretch  Geraldine received 0 minutes of manual therapy including:      Written Home Exercises Provided: Seated UT stretch, levator stretch  Geraldine demo good understanding of the education provided. Patient demo good return demo of skill of exercises.    See EMR in Patient Instructions for HEP given: Yes      Assessment       Initial Assessment (Pertinent finding, problem list and factors affecting outcome): Ms. Wynne is a 42 year old female, who presents to the clinic with complaints of chronic neck pain on the left side. She demonstrates decreased AROM of her cervical spine and B pec major tightness that limits her ability to use correct body mechanics with ADLs. Her B UE weakness also limits her ability to perform her usual ADLs with correct body mechanics and without an increase in symptoms. Her diffuse muscular tenderness on her L side indicates myofascial tightness. Her cervical upslide and downslide are WNL and do not limit her ability to perform her usual ADLs. Her current score on FOTO Neck Spine places her in the 40%<60% impaired, limited, or restricted category. She would benefit from physical therapy to improve her strength, ROM, and flexibility in order to return to her PLOF.     History  Co-morbidities and personal factors that may impact the plan of care Co-morbidities:   chronic neck pain      Personal Factors:   no deficits     low   Examination  Body Structures and Functions, activity limitations and participation restrictions that may impact the plan of care Body Regions:   neck  upper extremities    Body Systems:     ROM  strength  flexibility    Participation Restrictions:   Difficulty with household projects, gardening, running, sleeping, driving, work duties    Activity limitations:   Learning and applying knowledge  no deficits    General Tasks and Commands  no deficits    Communication  no deficits    Mobility  lifting and carrying objects  driving (bike, car, motorcycle)    Self care  no deficits    Domestic Life  doing house work (cleaning house, washing dishes, laundry)  gardening    Interactions/Relationships  no deficits    Life Areas  no deficits    Community and Social Life  recreation and leisure         moderate   Clinical Presentation evolving clinical presentation with changing clinical characteristics moderate   Decision Making/ Complexity Score: FOTO 41%       Rehab Potiential: good  Spiritual/Cultural Needs: None  Barriers to Rehab: None  Short Term Goals (4 Weeks):   1. This patient will be independent with a basic HEP.  2. This patient will increase cervical AROM WNL in order to drive with no increase in symptoms.   3. This patient will increase B UE strength by 1 grade in order to be able to carry her shoulder bag with no increase in symptoms.   4. This patient will have pain rating of 4/10 at worst with ADLs.  5. Patient able to score greater than or equal to 75 on the FOTO Neck placing patient in 20%<40% impaired, limited, or restricted category demonstrating overall decreased neck pain with functional activities.   Long Term Goals (8 Weeks):   1. This patient will be independent with an updated HEP.  2. This patient will increase B UE strength to 5/5 in order to be able to garden with no increase in symptoms.   3. This patient will have pain rating of 2/10 at worst with ADLs.  4. Patient able to score greater than or equal to 85 on the FOTO Neck placing patient in 1%<20% impaired, limited, or restricted category demonstrating overall decreased neck pain with functional activities.       Plan      Certification Period: 05/02/18 to 07/02/18  Recommended Treatment Plan: 2 times per week for 8 weeks: Group Therapy, Manual Therapy, Moist Heat/ Ice, Neuromuscular Re-ed, Patient Education and Therapeutic Exercise  Other Recommendations: modalities prn, IASTM prn, kinesiotape prn, Functional Dry Needling prn       Danielle James, PT  5/2/2018      I CERTIFY THE NEED FOR THESE SERVICES FURNISHED UNDER THIS PLAN OF TREATMENT AND WHILE UNDER MY CARE    Physician's comments: ________________________________________________________________________________________________________________________________________________      Physician's Name: ___________________________________

## 2018-05-02 NOTE — PATIENT INSTRUCTIONS
Side Bend, Sitting        Sit, hand over top of head. Gently pull head to one side. Hold 5 seconds.  Repeat 5 times per session. Do 2 sessions per day.    Copyright © I. All rights reserved.   Levator Scapula Stretch, Sitting        Sit, one hand tucked under hip on side to be stretched, other hand over top of head. Turn head toward other side and look down. Use hand on head to gently stretch neck in that position. Hold 5 seconds.  Repeat 5 times per session. Do 2 sessions per day.    Copyright © [employer]. All rights reserved.       Doorway Pec Stretch     a doorway and raise one arm up resting on the door frame. Turn away from outstretched arm until you feel a stretch. Hold for a count of 5. Do 5 times to each side.

## 2018-05-09 PROBLEM — R29.898 DECREASED RANGE OF MOTION OF NECK: Status: ACTIVE | Noted: 2018-05-09

## 2018-05-09 PROBLEM — R29.3 POOR POSTURE: Status: ACTIVE | Noted: 2018-05-09

## 2018-05-09 PROBLEM — R29.898 WEAKNESS OF BOTH UPPER EXTREMITIES: Status: ACTIVE | Noted: 2018-05-09

## 2018-05-10 ENCOUNTER — CLINICAL SUPPORT (OUTPATIENT)
Dept: REHABILITATION | Facility: HOSPITAL | Age: 43
End: 2018-05-10
Payer: COMMERCIAL

## 2018-05-10 DIAGNOSIS — R29.898 DECREASED RANGE OF MOTION OF NECK: ICD-10-CM

## 2018-05-10 DIAGNOSIS — R29.3 POOR POSTURE: ICD-10-CM

## 2018-05-10 DIAGNOSIS — R29.898 WEAKNESS OF BOTH UPPER EXTREMITIES: ICD-10-CM

## 2018-05-10 PROCEDURE — 97140 MANUAL THERAPY 1/> REGIONS: CPT | Mod: PO

## 2018-05-10 PROCEDURE — 97110 THERAPEUTIC EXERCISES: CPT | Mod: PO

## 2018-05-10 NOTE — PATIENT INSTRUCTIONS
AROM: Neck Flexion        Bend head forward. Hold 3 seconds.  Repeat 10 times per set. Do 1 sets per session. Do 2 sessions per day.     AROM: Neck Extension        Bend head backward. Hold 3 seconds.  Repeat 10 times per set. Do 1 sets per session. Do 2 sessions per day.      AROM: Lateral Neck Flexion        Slowly tilt head toward one shoulder, then the other. Hold each position 3 seconds.  Repeat 10 times per set. Do 1 sets per session. Do 2 sessions per day.  Flexibility: Upper Trapezius Stretch        Gently grasp right side of head while reaching behind back with other hand. Tilt head away until a gentle stretch is felt. Hold 5 seconds.  Repeat 5 times per set. Do 1 sets per session. Do 2 sessions per day.   .    Flexibility: Neck Retraction        Pull head straight back, keeping eyes and jaw level.  Repeat 10 times per set. Do 1 sets per session. Do 2 sessions per day.     Scapular Retraction (Standing)        With arms at sides, pinch shoulder blades together.  Repeat 10 times per set. Do 1 sets per session. Do 2 sessions per day.  Scapular Retraction: Bilateral        Facing anchor, pull arms back, bringing shoulder blades together.  Repeat 10 times per set. Do 3 sets per session. Do 2 sessions per day.       Arm Sweep: [place]        Get ON TARGET. Lie on flat up roller, feet on full roller. Keeping arms on floor, sweep out and up beyond head. Stop and stretch as tightness develops.   Repeat 10 times. Do 1 sessions per day.     Copyright © [employer]. All rights reserved.

## 2018-05-10 NOTE — PROGRESS NOTES
"DAILY TREATMENT NOTE    DATE: 5/10/2018    Start Time:  11:00  Stop Time:  11:48    PROCEDURES:    TIMED  Procedure Min.   TE 20   MT 10   TE sup 18NC             UNTIMED  Procedure Min.             Total Timed Minutes:  30  Total Timed Units:  2  Total Untimed Units:  0  Charges Billed/# of units:  2      Progress/Current Status    Subjective:     Patient ID: Geraldine Wynne is a 42 y.o. female.  Diagnosis:   1. Decreased range of motion of neck     2. Weakness of both upper extremities     3. Poor posture       Pain: 7 /10  She reports having pain today in her UT area and it is going up into her suboccipital area. Yesterday her pain went down into her L bicep.     Objective:     Patient was educated and performed therapeutic exercises as per log, 1:1 with PT x 20 minutes and supervised by PT x 18 minutes. She declined a cold pack at the end of the session.     Dry needling with trigger point/manual therapy techniques was performed by PT x 10 minutes. Dry needling performed to symptomatic point in L SCM, L spinal accessory homeostatic neuro-trigger, L supraspinatus symptomatic point. All needles were removed and changes in signs and symptoms were noted, no adverse events. Dry needling was performed to decrease inflammation, increase circulation, decrease pain and restore homeostasis.  Dry needling consent form was reviewed with the patient addressing all questions and concerns and signed by patient.  Copy of the consent form was provided to patient and copy of consent form scanned to patient Epic chart.    Date 5/10/18   Visit 2/30   FTF 6/2/18   FOTO 2/5   POC exp 7/2/18   MHP --   MT 10'   UT Str. 5 x 5"   LV Str. 5x 5"   Pec Str. 5 x 5"   Chin Tuck 1 x 10       Snow angles   1 x 10     Lats --   Rows 1 x 10 RTB   W  I  T  Y --  --  --  --   Scaption --   Wall Slides --   Initials DG       Assessment:     Patient was able to begin making progress towards her goals as she was able to perform all of today's activities " with no increase in symptoms prior to leaving the clinic. She required frequent cues with all exercises for scapula placement in order to decrease the use of B UT. She had no adverse events with STM. She would benefit from continued physical therapy to improve her strength, posture, and flexibility.     Patient Education/Response:     Patient was given handouts of today's exercises for her HEP and instructed to perform her HEP to her tolerance twice a day. She verbalized understanding instructions.     Plans and Goals:     Continue with the plan of care and progress as the patient tolerates.     Short Term Goals (4 Weeks):   1. This patient will be independent with a basic HEP.  2. This patient will increase cervical AROM WNL in order to drive with no increase in symptoms.   3. This patient will increase B UE strength by 1 grade in order to be able to carry her shoulder bag with no increase in symptoms.   4. This patient will have pain rating of 4/10 at worst with ADLs.  5. Patient able to score greater than or equal to 75 on the FOTO Neck placing patient in 20%<40% impaired, limited, or restricted category demonstrating overall decreased neck pain with functional activities.   Long Term Goals (8 Weeks):   1. This patient will be independent with an updated HEP.  2. This patient will increase B UE strength to 5/5 in order to be able to garden with no increase in symptoms.   3. This patient will have pain rating of 2/10 at worst with ADLs.  4. Patient able to score greater than or equal to 85 on the FOTO Neck placing patient in 1%<20% impaired, limited, or restricted category demonstrating overall decreased neck pain with functional activities.

## 2018-05-14 ENCOUNTER — CLINICAL SUPPORT (OUTPATIENT)
Dept: REHABILITATION | Facility: HOSPITAL | Age: 43
End: 2018-05-14
Payer: COMMERCIAL

## 2018-05-14 DIAGNOSIS — R29.898 WEAKNESS OF BOTH UPPER EXTREMITIES: ICD-10-CM

## 2018-05-14 DIAGNOSIS — R29.898 DECREASED RANGE OF MOTION OF NECK: ICD-10-CM

## 2018-05-14 DIAGNOSIS — R29.3 POOR POSTURE: ICD-10-CM

## 2018-05-14 PROCEDURE — 97140 MANUAL THERAPY 1/> REGIONS: CPT | Mod: PO

## 2018-05-14 PROCEDURE — 97110 THERAPEUTIC EXERCISES: CPT | Mod: PO

## 2018-05-14 NOTE — PROGRESS NOTES
DAILY TREATMENT NOTE    DATE: 5/14/2018    Start Time:  3:25  Stop Time:  3:35    PROCEDURES:    TIMED  Procedure Min.       MT 10                 UNTIMED  Procedure Min.             Total Timed Minutes:  10  Total Timed Units:  1  Total Untimed Units:  0  Charges Billed/# of units:  1      Progress/Current Status    Subjective:     Patient ID: Geraldine Wynne is a 42 y.o. female.  Diagnosis:   1. Decreased range of motion of neck     2. Weakness of both upper extremities     3. Poor posture       Pain: 7 /10  She reports having a 30% decrease in symptoms after [facility] last visit, but it did not last into the next day.     Objective:     See daily progress note by Marvel Villa PTA.    Dry needling with trigger point/manual therapy techniques was performed by PT x 10 minutes. Dry needling performed to symptomatic point in L SCM, L spinal accessory homeostatic neuro-trigger, L supraspinatus symptomatic point. All needles were removed and changes in signs and symptoms were noted, no adverse events. Dry needling was performed to decrease inflammation, increase circulation, decrease pain and restore homeostasis.  Dry needling consent form was reviewed with the patient addressing all questions and concerns and signed by patient.  Copy of the consent form was provided to patient and copy of consent form scanned to patient Epic chart.      Assessment:     She had no adverse events with STM. She would benefit from continued physical therapy to improve her strength, posture, and flexibility.     See daily progress note by Marvel Villa PTA    Patient Education/Response:     Patient was instructed to continue with her HEP to her tolerance twice a day. She verbalized understanding instructions.     Plans and Goals:     Continue with the plan of care and progress as the patient tolerates.     Short Term Goals (4 Weeks):   1. This patient will be independent with a basic HEP.  2. This patient will increase cervical AROM WNL in  order to drive with no increase in symptoms.   3. This patient will increase B UE strength by 1 grade in order to be able to carry her shoulder bag with no increase in symptoms.   4. This patient will have pain rating of 4/10 at worst with ADLs.  5. Patient able to score greater than or equal to 75 on the FOTO Neck placing patient in 20%<40% impaired, limited, or restricted category demonstrating overall decreased neck pain with functional activities.   Long Term Goals (8 Weeks):   1. This patient will be independent with an updated HEP.  2. This patient will increase B UE strength to 5/5 in order to be able to garden with no increase in symptoms.   3. This patient will have pain rating of 2/10 at worst with ADLs.  4. Patient able to score greater than or equal to 85 on the FOTO Neck placing patient in 1%<20% impaired, limited, or restricted category demonstrating overall decreased neck pain with functional activities.

## 2018-05-14 NOTE — PROGRESS NOTES
"DAILY TREATMENT NOTE    DATE: 5/14/2018    Start Time:  3:10  Stop Time:  3:25    PROCEDURES:    TIMED  Procedure Min.   TE 15                     UNTIMED  Procedure Min.             Total Timed Minutes:  15  Total Timed Units:  1  Total Untimed Units:  0  Charges Billed/# of units:  1 (TE-1)      Progress/Current Status    Subjective:     Patient ID: Geraldine Wynne is a 42 y.o. female.  Diagnosis:   1. Decreased range of motion of neck     2. Weakness of both upper extremities     3. Poor posture       Pain: 7 /10  Patient reports having pain in her left UT.  Patient states she has to hurry as she has to hurry and return to work as she could not use her lunch time for now.    Objective:     Patient was educated and performed therapeutic exercises as per log, 1:1 with PTA x 15 minutes. She declined a cold pack at the end of the session.     Geraldine received 10 minutes of dry needling performed by Danielle James, YASH. See separate note dated 5/14/2018 for details.      Date 05/14/18 5/10/18   Visit 3/30 2/30   FTF 06/02/18 6/2/18   FOTO 3/5 2/5   POC exp 07/02/18 7/2/18        [facility] -- --   MT -- 10'   UT Str. 5 x 5" 5 x 5"   LV Str. 5 x 5" 5x 5"   Chin Tuck 1 x 10 1 x 10        Snow angles  Butterflies  1 x 10  1 x 10 1 x 10     Pec Str. 5 x 5" 5 x 5"   Lats -- --   Rows 2 x 10 RTB 1 x 10 RTB   W  I  T  Y --  --  --  -- --  --  --  --   Scaption -- --   Wall Slides -- --        Initials GWA 1/6 DG       Assessment:     Patient continues to require frequent cues with all exercises for scapula placement in order to decrease the use of B UT.  She would benefit from continued physical therapy to improve her strength, posture, and flexibility. Geraldine received 10 minutes of dry needling performed by Danielle James, YASH. See separate note dated 5/14/2018 for details.    Patient Education/Response:     Patient was instructed to continue with her HEP to her tolerance twice a day. She verbalized understanding instructions.     Plans and " Goals:     Continue with the plan of care and progress as the patient tolerates.     Short Term Goals (4 Weeks):   1. This patient will be independent with a basic HEP.  2. This patient will increase cervical AROM WNL in order to drive with no increase in symptoms.   3. This patient will increase B UE strength by 1 grade in order to be able to carry her shoulder bag with no increase in symptoms.   4. This patient will have pain rating of 4/10 at worst with ADLs.  5. Patient able to score greater than or equal to 75 on the FOTO Neck placing patient in 20%<40% impaired, limited, or restricted category demonstrating overall decreased neck pain with functional activities.     Long Term Goals (8 Weeks):   1. This patient will be independent with an updated HEP.  2. This patient will increase B UE strength to 5/5 in order to be able to garden with no increase in symptoms.   3. This patient will have pain rating of 2/10 at worst with ADLs.  4. Patient able to score greater than or equal to 85 on the FOTO Neck placing patient in 1%<20% impaired, limited, or restricted category demonstrating overall decreased neck pain with functional activities.

## 2018-05-17 ENCOUNTER — CLINICAL SUPPORT (OUTPATIENT)
Dept: REHABILITATION | Facility: HOSPITAL | Age: 43
End: 2018-05-17
Payer: COMMERCIAL

## 2018-05-17 DIAGNOSIS — R29.898 WEAKNESS OF BOTH UPPER EXTREMITIES: ICD-10-CM

## 2018-05-17 DIAGNOSIS — R29.3 POOR POSTURE: ICD-10-CM

## 2018-05-17 DIAGNOSIS — R29.898 DECREASED RANGE OF MOTION OF NECK: ICD-10-CM

## 2018-05-17 PROCEDURE — 97110 THERAPEUTIC EXERCISES: CPT | Mod: PO

## 2018-05-17 PROCEDURE — 97140 MANUAL THERAPY 1/> REGIONS: CPT | Mod: PO

## 2018-05-17 NOTE — PROGRESS NOTES
"DAILY TREATMENT NOTE    DATE: 5/17/2018    Start Time:  11:00  Stop Time:  11:35    PROCEDURES:    TIMED  Procedure Min.   TE 20   MT 15                 UNTIMED  Procedure Min.             Total Timed Minutes:  35  Total Timed Units:  2  Total Untimed Units:  0  Charges Billed/# of units:  2 (TE-1, MT-1)      Progress/Current Status    Subjective:     Patient ID: Geraldine Wynne is a 42 y.o. female.  Diagnosis:   1. Decreased range of motion of neck     2. Weakness of both upper extremities     3. Poor posture       Pain: 7 /10  Patient reports having pain in her UT and back of the head. She would like to do more aggressive needling today.     Objective:     Patient was educated and performed therapeutic exercises as per log, along with today's progressions and new exercises 1:1 with PT x 20 minutes to improve her strength, flexibility, and posture. She declined a cold pack at the end of the session.     Dry needling with trigger point/manual therapy techniques was performed by PT x 15 minutes. Dry needling performed to symptomatic point in L SCM, L spinal accessory homeostatic neuro-trigger, L supraspinatus symptomatic point, L subscapularis(3). All needles were removed and changes in signs and symptoms were noted, no adverse events. Dry needling was performed to decrease inflammation, increase circulation, decrease pain and restore homeostasis.  Dry needling consent form was reviewed with the patient addressing all questions and concerns and signed by patient.  Copy of the consent form was provided to patient and copy of consent form scanned to patient Epic chart.    Date 05/17/18 05/14/18 5/10/18   Visit 4/30 3/30 2/30   FTF 06/02/18 06/02/18 6/2/18   FOTO 4/5 3/5 2/5   POC exp 07/02/18 07/02/18 7/2/18         [facility] -- -- --   MT -- -- 10'   UT Str. 5 x 5" 5 x 5" 5 x 5"   LV Str. 5 x 5" 5 x 5" 5x 5"   Chin Tuck 1 x 10 1 x 10 1 x 10           Snow angles  Butterflies  Towel roll  1 x 15  1 x 15 1 x 10  1 x 10 1 x 10   " "  Pec Str. @ home 5 x 5" 5 x 5"   Lats -- -- --   Rows 2 x 10 RTB 2 x 10 RTB 1 x 10 RTB   W  I  T  Y 1 x 10 RTB  --  1 x 10 RTB  -- --  --  --  -- --  --  --  --   Scaption 1 x 10 -- --   Wall Slides -- -- --         Initials DG GWA 1/6 DG       Assessment:     She continues to require cues for scapula placement with standing resistance exercises. No adverse events noted with dry needling. She was able to perform all of today's progressions and new exercises with no increase in symptoms prior to leaving the clinic. She would benefit from continued physical therapy to improve her strength, posture, and flexibility.     Patient Education/Response:     Patient was instructed to continue with her HEP to her tolerance twice a day. She verbalized understanding instructions.     Plans and Goals:     Continue with the plan of care and progress as the patient tolerates.     Short Term Goals (4 Weeks):   1. This patient will be independent with a basic HEP.  2. This patient will increase cervical AROM WNL in order to drive with no increase in symptoms.   3. This patient will increase B UE strength by 1 grade in order to be able to carry her shoulder bag with no increase in symptoms.   4. This patient will have pain rating of 4/10 at worst with ADLs.  5. Patient able to score greater than or equal to 75 on the FOTO Neck placing patient in 20%<40% impaired, limited, or restricted category demonstrating overall decreased neck pain with functional activities.     Long Term Goals (8 Weeks):   1. This patient will be independent with an updated HEP.  2. This patient will increase B UE strength to 5/5 in order to be able to garden with no increase in symptoms.   3. This patient will have pain rating of 2/10 at worst with ADLs.  4. Patient able to score greater than or equal to 85 on the FOTO Neck placing patient in 1%<20% impaired, limited, or restricted category demonstrating overall decreased neck pain with functional activities. "

## 2018-05-17 NOTE — PATIENT INSTRUCTIONS
"    W I T Y standing with band    WITY For Shoulder and Upper Back  W: Elevate arms lifting elbows to shoulder height, externally rotate to form "W"  I: Pull hands down toward hips to form "I"  T: Hands away from body, thumbs pointing behind you form "T"  Y: Raise arms overhead slightly away from vertical, form "Y"     Do 3 sets of 10 reps of each 2 x a day.        Scaption    Standing with your thumb facing up, raise your arms up towards the ceiling with your arms slightly pulled forward at about a 30 degree angle.    Make sure your shoulder blades are squeeze together. Lower arms down slowly  "

## 2018-05-22 ENCOUNTER — CLINICAL SUPPORT (OUTPATIENT)
Dept: REHABILITATION | Facility: HOSPITAL | Age: 43
End: 2018-05-22
Payer: COMMERCIAL

## 2018-05-22 DIAGNOSIS — R29.898 DECREASED RANGE OF MOTION OF NECK: ICD-10-CM

## 2018-05-22 DIAGNOSIS — R29.3 POOR POSTURE: ICD-10-CM

## 2018-05-22 DIAGNOSIS — R29.898 WEAKNESS OF BOTH UPPER EXTREMITIES: ICD-10-CM

## 2018-05-22 PROCEDURE — 97110 THERAPEUTIC EXERCISES: CPT | Mod: PO

## 2018-05-22 PROCEDURE — 97140 MANUAL THERAPY 1/> REGIONS: CPT | Mod: PO

## 2018-05-22 NOTE — PROGRESS NOTES
"DAILY TREATMENT NOTE    DATE: 5/22/2018    Start Time:  11:00  Stop Time:  11:35    PROCEDURES:    TIMED  Procedure Min.   TE 20   MT 15                 UNTIMED  Procedure Min.             Total Timed Minutes:  35  Total Timed Units:  2  Total Untimed Units:  0  Charges Billed/# of units:  2 (TE-1, MT-1)      Progress/Current Status    Subjective:     Patient ID: Geraldine Wynne is a 42 y.o. female.  Diagnosis:   1. Decreased range of motion of neck     2. Weakness of both upper extremities     3. Poor posture       Pain: 6 /10  Patient reports feeling a little better today, and would like the dry needling to focus more on her upper trap today.     Objective:     Patient was educated and performed therapeutic exercises as per log, along with today's progressions and new exercises 1:1 with PT x 20 minutes to improve her strength, flexibility, and posture. She declined a cold pack at the end of the session.     Dry needling with trigger point/manual therapy techniques was performed by PT x 15 minutes in sitting and prone. Dry needling performed to L spinal accessory homeostatic neuro-trigger (2x), L supraspinatus symptomatic point(2x). All needles were removed and changes in signs and symptoms were noted, no adverse events. Dry needling was performed to decrease inflammation, increase circulation, decrease pain and restore homeostasis.  Dry needling consent form was reviewed with the patient addressing all questions and concerns and signed by patient.  Copy of the consent form was provided to patient and copy of consent form scanned to patient Epic chart.    Date 05/22/18 05/17/18 05/14/18 5/10/18   Visit 5/30 4/30 3/30 2/30   FTF 06/02/18 06/02/18 06/02/18 6/2/18   FOTO 5/5 4/5 3/5 2/5   POC exp 07/02/18 07/02/18 07/02/18 7/2/18          [facility]  -- -- --   MT 15' -- -- 10'   UT Str. @ home 5 x 5" 5 x 5" 5 x 5"   LV Str. @ home 5 x 5" 5 x 5" 5x 5"   Chin Tuck @ home 1 x 10 1 x 10 1 x 10            Snow angles  Butterflies  " "@ home Towel roll  1 x 15  1 x 15 1 x 10  1 x 10 1 x 10     Pec Str. @ home @ home 5 x 5" 5 x 5"   Lats -- -- -- --   Rows 2 x 10 GTB 2 x 10 RTB 2 x 10 RTB 1 x 10 RTB   W  I  T  Y 1 x 15 GTB  1 x 10 RTB  1 x 10 GTB  -- 1 x 10 RTB  --  1 x 10 RTB  -- --  --  --  -- --  --  --  --   Scaption 1 x 10 1 x 10 -- --   Wall Slides -- -- -- --          Initials DG DG GWA 1/6 DG     FOTO 56, 40%<60% impaired, limited, or restricted   Assessment:     Patient required occasional cues for scapula placement with standing resistance exercises. No adverse events noted with dry needling. She was able to perform all of today's progressions and new exercises with no increase in symptoms prior to leaving the clinic. Her current score on FOTO Neck places her in the 40%<60% impaired, limited, or restricted category. She would benefit from continued physical therapy to improve her strength, posture, and flexibility.     Patient Education/Response:     Patient was instructed to continue with her HEP to her tolerance twice a day. She verbalized understanding instructions.     Plans and Goals:     Continue with the plan of care and progress as the patient tolerates.     Short Term Goals (4 Weeks):   1. This patient will be independent with a basic HEP.  2. This patient will increase cervical AROM WNL in order to drive with no increase in symptoms.   3. This patient will increase B UE strength by 1 grade in order to be able to carry her shoulder bag with no increase in symptoms.   4. This patient will have pain rating of 4/10 at worst with ADLs.  5. Patient able to score greater than or equal to 75 on the FOTO Neck placing patient in 20%<40% impaired, limited, or restricted category demonstrating overall decreased neck pain with functional activities.     Long Term Goals (8 Weeks):   1. This patient will be independent with an updated HEP.  2. This patient will increase B UE strength to 5/5 in order to be able to garden with no increase in " symptoms.   3. This patient will have pain rating of 2/10 at worst with ADLs.  4. Patient able to score greater than or equal to 85 on the FOTO Neck placing patient in 1%<20% impaired, limited, or restricted category demonstrating overall decreased neck pain with functional activities.

## 2018-06-12 ENCOUNTER — TELEPHONE (OUTPATIENT)
Dept: GASTROENTEROLOGY | Facility: CLINIC | Age: 43
End: 2018-06-12

## 2018-06-12 NOTE — TELEPHONE ENCOUNTER
----- Message from Sharon Brennan MA sent at 6/11/2018  3:31 PM CDT -----  Contact: self, [phone]  Patient states that she received an bill from [facility] and [facility] both for $600.00 each. Patient would like to know what happen in regards. Patient is very upset in regards. Patient stated that she contacted [facility] billing department and could not get an answer.   ----- Message -----  From: Lalitha Garcia  Sent: 6/11/2018  12:35 PM  To: Kendra Ontiveros Staff ([place])    Patient requests to speak with you regarding a billing issue. States billing dept can't assist.  Please advise.

## 2018-06-12 NOTE — TELEPHONE ENCOUNTER
Spoke with patient and she is aware that this office don't have a billing dept. Patient is aware that if she have any billing questions she needs to call [facility]  or [facility] billing dept.

## 2019-05-07 DIAGNOSIS — K59.09 CHRONIC CONSTIPATION: Primary | ICD-10-CM

## 2019-05-07 RX ORDER — LUBIPROSTONE 8 UG/1
8 CAPSULE ORAL 2 TIMES DAILY
Qty: 60 CAPSULE | Refills: 0 | Status: SHIPPED | OUTPATIENT
Start: 2019-05-07 | End: 2020-01-21

## 2019-05-07 NOTE — TELEPHONE ENCOUNTER
----- Message from Cyndee Pascal sent at 5/7/2019 10:13 AM CDT -----  No. [phone]   Will Dr. Reeder prescribe Amitiza for patient.  She has chronic constipation.   Please call.

## 2019-05-07 NOTE — TELEPHONE ENCOUNTER
Patient states the medication cost 220 dollars for a 30 day supply but if its sent to mail order for 90 day supply it's much cheaper. Patient wanted to know if she should stop seeing a gastroenterologist and just let Dr. Reeder continue to prescribe this medication because no one seems to know what's the issue with her stomach. I advised patient a referral will be sent to 's office to see if she would consider seeing her for Gastro.

## 2019-05-08 RX ORDER — LUBIPROSTONE 8 UG/1
8 CAPSULE ORAL 2 TIMES DAILY
Qty: 180 CAPSULE | Refills: 0 | OUTPATIENT
Start: 2019-05-08

## 2019-05-08 NOTE — TELEPHONE ENCOUNTER
Left message advising patient to contact office. Dr. Reeder only sent a 30 day supply of Amitza , a 90 day supply will not be sent. Patient needs to schedule a appointment

## 2019-05-08 NOTE — TELEPHONE ENCOUNTER
Maria MOON [facility] Staff   Caller: self / [phone] (Today,  3:56 PM)             Patient is requesting a call back regarding, needs a 3 month supply of the medication the  gave her, but she do not know the name of it. Please advise

## 2019-06-20 ENCOUNTER — LAB VISIT (OUTPATIENT)
Dept: LAB | Facility: HOSPITAL | Age: 44
End: 2019-06-20
Attending: INTERNAL MEDICINE
Payer: COMMERCIAL

## 2019-06-20 DIAGNOSIS — K59.04 CHRONIC IDIOPATHIC CONSTIPATION: Primary | ICD-10-CM

## 2019-06-20 LAB
ALBUMIN SERPL BCP-MCNC: 4 G/DL (ref 3.5–5.2)
ALP SERPL-CCNC: 42 U/L (ref 38–126)
ALT SERPL W/O P-5'-P-CCNC: 18 U/L (ref 10–44)
ANION GAP SERPL CALC-SCNC: 5 MMOL/L (ref 8–16)
AST SERPL-CCNC: 31 U/L (ref 15–46)
BASOPHILS # BLD AUTO: 0.01 K/UL (ref 0–0.2)
BASOPHILS NFR BLD: 0.2 % (ref 0–1.9)
BILIRUB SERPL-MCNC: 0.6 MG/DL (ref 0.1–1)
BUN SERPL-MCNC: 15 MG/DL (ref 7–17)
CALCIUM SERPL-MCNC: 9.3 MG/DL (ref 8.7–10.5)
CHLORIDE SERPL-SCNC: 104 MMOL/L (ref 95–110)
CO2 SERPL-SCNC: 30 MMOL/L (ref 23–29)
CREAT SERPL-MCNC: 0.76 MG/DL (ref 0.5–1.4)
DIFFERENTIAL METHOD: ABNORMAL
EOSINOPHIL # BLD AUTO: 0.1 K/UL (ref 0–0.5)
EOSINOPHIL NFR BLD: 2.8 % (ref 0–8)
ERYTHROCYTE [DISTWIDTH] IN BLOOD BY AUTOMATED COUNT: 13.1 % (ref 11.5–14.5)
EST. GFR  (AFRICAN AMERICAN): >60 ML/MIN/1.73 M^2
EST. GFR  (NON AFRICAN AMERICAN): >60 ML/MIN/1.73 M^2
GLUCOSE SERPL-MCNC: 83 MG/DL (ref 70–110)
HCT VFR BLD AUTO: 42 % (ref 37–48.5)
HGB BLD-MCNC: 12.9 G/DL (ref 12–16)
LYMPHOCYTES # BLD AUTO: 2.6 K/UL (ref 1–4.8)
LYMPHOCYTES NFR BLD: 51 % (ref 18–48)
MCH RBC QN AUTO: 28.8 PG (ref 27–31)
MCHC RBC AUTO-ENTMCNC: 30.7 G/DL (ref 32–36)
MCV RBC AUTO: 94 FL (ref 82–98)
MONOCYTES # BLD AUTO: 0.5 K/UL (ref 0.3–1)
MONOCYTES NFR BLD: 10.8 % (ref 4–15)
NEUTROPHILS # BLD AUTO: 1.7 K/UL (ref 1.8–7.7)
NEUTROPHILS NFR BLD: 34.8 % (ref 38–73)
PLATELET # BLD AUTO: 160 K/UL (ref 150–350)
PMV BLD AUTO: 11.8 FL (ref 9.2–12.9)
POTASSIUM SERPL-SCNC: 4.3 MMOL/L (ref 3.5–5.1)
PROT SERPL-MCNC: 7.2 G/DL (ref 6–8.4)
RBC # BLD AUTO: 4.48 M/UL (ref 4–5.4)
SODIUM SERPL-SCNC: 139 MMOL/L (ref 136–145)
T4 FREE SERPL-MCNC: 0.87 NG/DL (ref 0.71–1.51)
TSH SERPL DL<=0.005 MIU/L-ACNC: 1.2 UIU/ML (ref 0.4–4)
WBC # BLD AUTO: 5 K/UL (ref 3.9–12.7)

## 2019-06-20 PROCEDURE — 85025 COMPLETE CBC W/AUTO DIFF WBC: CPT | Mod: PO

## 2019-06-20 PROCEDURE — 84443 ASSAY THYROID STIM HORMONE: CPT | Mod: PO

## 2019-06-20 PROCEDURE — 84439 ASSAY OF FREE THYROXINE: CPT

## 2019-06-20 PROCEDURE — 36415 COLL VENOUS BLD VENIPUNCTURE: CPT | Mod: PO

## 2019-06-20 PROCEDURE — 80053 COMPREHEN METABOLIC PANEL: CPT | Mod: PO

## 2020-01-21 ENCOUNTER — OFFICE VISIT (OUTPATIENT)
Dept: FAMILY MEDICINE | Facility: CLINIC | Age: 45
End: 2020-01-21
Payer: COMMERCIAL

## 2020-01-21 VITALS
BODY MASS INDEX: 20.94 KG/M2 | DIASTOLIC BLOOD PRESSURE: 60 MMHG | TEMPERATURE: 99 F | HEIGHT: 65 IN | SYSTOLIC BLOOD PRESSURE: 96 MMHG | HEART RATE: 70 BPM | OXYGEN SATURATION: 97 % | WEIGHT: 125.69 LBS

## 2020-01-21 DIAGNOSIS — Z12.39 SCREENING FOR BREAST CANCER: ICD-10-CM

## 2020-01-21 DIAGNOSIS — M54.2 NECK PAIN: Primary | ICD-10-CM

## 2020-01-21 DIAGNOSIS — Z01.419 ENCOUNTER FOR ANNUAL ROUTINE GYNECOLOGICAL EXAMINATION: ICD-10-CM

## 2020-01-21 PROCEDURE — 3008F BODY MASS INDEX DOCD: CPT | Mod: CPTII,S$GLB,, | Performed by: FAMILY MEDICINE

## 2020-01-21 PROCEDURE — 99213 PR OFFICE/OUTPT VISIT, EST, LEVL III, 20-29 MIN: ICD-10-PCS | Mod: S$GLB,,, | Performed by: FAMILY MEDICINE

## 2020-01-21 PROCEDURE — 99213 OFFICE O/P EST LOW 20 MIN: CPT | Mod: S$GLB,,, | Performed by: FAMILY MEDICINE

## 2020-01-21 PROCEDURE — 3008F PR BODY MASS INDEX (BMI) DOCUMENTED: ICD-10-PCS | Mod: CPTII,S$GLB,, | Performed by: FAMILY MEDICINE

## 2020-01-23 ENCOUNTER — TELEPHONE (OUTPATIENT)
Dept: FAMILY MEDICINE | Facility: CLINIC | Age: 45
End: 2020-01-23

## 2020-01-23 NOTE — TELEPHONE ENCOUNTER
I change order to outgoing MRI at [place]  I contacted [facility] to do an insurance auth    I faxed order to [facility] and will notify them once ins approval is completed  I notified the pt

## 2020-01-23 NOTE — TELEPHONE ENCOUNTER
----- Message from Dixie Rogers sent at 1/23/2020  9:03 AM CST -----  Type:  Patient Requesting MRI ORDER TO BE FAXED    Who Called: [facility]     Does the patient already have the specialty appointment scheduled?: No    If yes, what is the date of that appointment?:None    Referral to What Specialty: MRI CERVICAL SPINE WITHOUT CONTRAST ORDER    Reason for Referral: PT WOULD LIKE A LOCATION OUTSIDE OF [facility]    Does the patient want the referral with a specific physician?:NO    Is the specialist an [facility] or [facility]?: NON    Patient Requesting a Response?: BRINA OREILLY [phone]    Would the patient rather a call back or a response via [facility]?  CALL BACK    Best Call Back Number: [phone]    Additional Information:  [facility] IS CALLING ON THE BEHALF O FPT , PT WANTS MRI FAXED TO THEM. FAX #-[phone]

## 2020-01-26 NOTE — PROGRESS NOTES
" Patient ID: Geraldine Huerta is a 44 y.o. female.    Chief Complaint: Neck Pain    HPI      Geraldine Huerta is a 44 y.o. female.  Here for evaluation regarding left trapezius and posterior neck pain. She has been dealing with this upwards of 10 years.  She has had MRIs in the distant past.  She has had physical therapy several different kinds.  She has used dry needling.  Despite these modalities pain in her neck is continued.  It is not fully debilitating but aggravating throughout the day.  She does have exacerbations depending on position often times.  No radiation on arm.  No tingling or loss of strength.  She does say that she has decreased range of motion with inability to turn fully to her right.      Review of Symptoms    Constitutional: Negative.    HENT: Negative.    Eyes: Negative.    Respiratory: Negative.    Cardiovascular: Negative.    Gastrointestinal: Negative.    Endocrine: Negative.    Genitourinary: Negative.    Musculoskeletal: Negative.    Skin: Negative.    Allergic/Immunologic: Negative.    Neurological: Negative.    Hematological: Negative.    Psychiatric/Behavioral: Negative.      Except as above in HPI      Vitals:    01/21/20 1527   BP: 96/60   Pulse: 70   Temp: 98.7 °F (37.1 °C)   SpO2: 97%   Weight: 57 kg (125 lb 10.6 oz)   Height: 5' 5" (1.651 m)        Physical  Exam      Constitutional:  Oriented to person, place, and time. Appears well-developed and well-nourished.     HENT:   Head: Normocephalic and atraumatic.     Right Ear: Tympanic membrane, ear canal and External ear normal     Left Ear: Tympanic membrane, ear canal and External ear normal     Nose: Nose normal. No rhinorrhea or nasal deformity.     Mouth/Throat: Uvula is midline, oropharynx is clear and moist and mucous membranes are normal.      Eyes: Conjunctivae are normal. Right eye exhibits no discharge. Left eye exhibits no discharge. No scleral icterus.     Neck:  No JVD present. No tracheal deviation  []  Neck supple.   []  No " Carotid bruit    Cardiovascular:  Regular rate and rhythm with normal S1 and S2     Pulmonary/Chest:   Clear to auscultation bilaterally without wheezes, rhonchi or rales    Musculoskeletal: Normal range of motion. No edema or tenderness.   No deformity   Rotation and neck approximately 80°-not able to go 90° over shoulder  Tender to palpation left upper trapezius muscle.    Lymphadenopathy:  No cervical adenopathy.     Neurological:  Alert and oriented to person, place, and time. Coordination normal.     Skin: Skin is warm and dry. No rash noted.     Psychiatric: Normal mood and affect. Speech is normal and behavior is normal. Judgment and thought content normal.     Complete Blood Count  Lab Results   Component Value Date    RBC 4.48 06/20/2019    HGB 12.9 06/20/2019    HCT 42.0 06/20/2019    MCV 94 06/20/2019    MCH 28.8 06/20/2019    MCHC 30.7 (L) 06/20/2019    RDW 13.1 06/20/2019     06/20/2019    MPV 11.8 06/20/2019    GRAN 1.7 (L) 06/20/2019    GRAN 34.8 (L) 06/20/2019    LYMPH 2.6 06/20/2019    LYMPH 51.0 (H) 06/20/2019    MONO 0.5 06/20/2019    MONO 10.8 06/20/2019    EOS 0.1 06/20/2019    BASO 0.01 06/20/2019    EOSINOPHIL 2.8 06/20/2019    BASOPHIL 0.2 06/20/2019    DIFFMETHOD Automated 06/20/2019       Comprehensive Metabolic Panel  No results found for: GLU, BUN, CREATININE, NA, K, CL, PROT, ALBUMIN, BILITOT, AST, ALKPHOS, CO2, ALT, ANIONGAP, EGFRNONAA, ESTGFRAFRICA    TSH  No results found for: TSH    Assessment / Plan:      ICD-10-CM ICD-9-CM   1. Neck pain M54.2 723.1   2. Screening for breast cancer Z12.39 V76.10   3. Encounter for annual routine gynecological examination Z01.419 V72.31     Neck pain  -     MRI Cervical Spine Without Contrast; Future; Expected date: 01/21/2020    Screening for breast cancer  -     Mammo Digital Screening Bilat w/ Sunil; Future; Expected date: 01/21/2020    Encounter for annual routine gynecological examination  -     Ambulatory referral to Obstetrics /  Gynecology      Consider referral to Physical Medicine and rehab if not able to find definitive cause on MRI.    Discussed how to stay healthy including: diet, exercise, refraining from smoking and discussed screening exams / tests needed for age, sex and family Hx.

## 2020-01-28 ENCOUNTER — TELEPHONE (OUTPATIENT)
Dept: INTERNAL MEDICINE | Facility: CLINIC | Age: 45
End: 2020-01-28

## 2020-01-28 ENCOUNTER — PATIENT MESSAGE (OUTPATIENT)
Dept: FAMILY MEDICINE | Facility: CLINIC | Age: 45
End: 2020-01-28

## 2020-01-28 DIAGNOSIS — M50.90 CERVICAL DISC DISEASE: Primary | ICD-10-CM

## 2020-01-28 NOTE — TELEPHONE ENCOUNTER
----- Message from Ct Pastrana sent at 1/24/2020  9:02 AM CST -----  patient wants to have this MRI at [facility]

## 2020-02-11 ENCOUNTER — OFFICE VISIT (OUTPATIENT)
Dept: OBSTETRICS AND GYNECOLOGY | Facility: CLINIC | Age: 45
End: 2020-02-11
Payer: COMMERCIAL

## 2020-02-11 VITALS
WEIGHT: 121.81 LBS | BODY MASS INDEX: 20.29 KG/M2 | DIASTOLIC BLOOD PRESSURE: 58 MMHG | HEIGHT: 65 IN | SYSTOLIC BLOOD PRESSURE: 90 MMHG

## 2020-02-11 DIAGNOSIS — Z01.419 ENCOUNTER FOR ANNUAL ROUTINE GYNECOLOGICAL EXAMINATION: Primary | ICD-10-CM

## 2020-02-11 DIAGNOSIS — Z12.4 PAP SMEAR FOR CERVICAL CANCER SCREENING: ICD-10-CM

## 2020-02-11 DIAGNOSIS — Z12.31 SCREENING MAMMOGRAM, ENCOUNTER FOR: ICD-10-CM

## 2020-02-11 PROCEDURE — 99999 PR PBB SHADOW E&M-EST. PATIENT-LVL III: CPT | Mod: PBBFAC,,, | Performed by: OBSTETRICS & GYNECOLOGY

## 2020-02-11 PROCEDURE — 87624 HPV HI-RISK TYP POOLED RSLT: CPT

## 2020-02-11 PROCEDURE — 99386 PR PREVENTIVE VISIT,NEW,40-64: ICD-10-PCS | Mod: S$GLB,,, | Performed by: OBSTETRICS & GYNECOLOGY

## 2020-02-11 PROCEDURE — 99386 PREV VISIT NEW AGE 40-64: CPT | Mod: S$GLB,,, | Performed by: OBSTETRICS & GYNECOLOGY

## 2020-02-11 PROCEDURE — 99999 PR PBB SHADOW E&M-EST. PATIENT-LVL III: ICD-10-PCS | Mod: PBBFAC,,, | Performed by: OBSTETRICS & GYNECOLOGY

## 2020-02-11 PROCEDURE — 88175 CYTOPATH C/V AUTO FLUID REDO: CPT

## 2020-02-11 NOTE — LETTER
February 11, 2020      Junito Elizabeth MD  [address] [facility] [address]           [facility]  [facility] [address]  [employer] [address]  Phone: [phone]  Fax: [phone]          Patient: Geraldine Huerta   MR Number: [id number]   YOB: 1975   Date of Visit: 2/11/2020       Dear Dr. Junito Elizabeth:    Thank you for referring Geraldine Huerta to me for evaluation. Attached you will find relevant portions of my assessment and plan of care.    If you have questions, please do not hesitate to call me. I look forward to following Geraldine Huerta along with you.    Sincerely,    Neli Brand MD    Enclosure  CC:  No Recipients    If you would like to receive this communication electronically, please contact [email or web address] or [phone] to request more information on [employer] access.    For providers and/or their staff who would like to refer a patient to [facility], please contact us through our one-stop-shop provider referral line, [facility] , at [phone].    If you feel you have received this communication in error or would no longer like to receive these types of communications, please e-mail [email or web address]

## 2020-02-11 NOTE — PROGRESS NOTES
Chief Complaint   Patient presents with    Annual Exam       HISTORY OF PRESENT ILLNESS:   Geraldine Huerta is a 44 y.o. female  who presents for well woman exam.  No LMP recorded..  She has no complaints. Cycles stopped 2 years ago. Has hotflashes but are manageable. Declines STD testing.      History reviewed. No pertinent past medical history.       Past Surgical History:   Procedure Laterality Date    breast implants           Social History     Socioeconomic History    Marital status:      Spouse name: Not on file    Number of children: Not on file    Years of education: Not on file    Highest education level: Not on file   Occupational History    Not on file   Social Needs    Financial resource strain: Not on file    Food insecurity:     Worry: Not on file     Inability: Not on file    Transportation needs:     Medical: Not on file     Non-medical: Not on file   Tobacco Use    Smoking status: Never Smoker    Smokeless tobacco: Never Used   Substance and Sexual Activity    Alcohol use: Yes     Comment: occ    Drug use: No    Sexual activity: Yes     Partners: Male     Birth control/protection: None   Lifestyle    Physical activity:     Days per week: Not on file     Minutes per session: Not on file    Stress: Not on file   Relationships    Social connections:     Talks on phone: Not on file     Gets together: Not on file     Attends Mosque service: Not on file     Active member of club or organization: Not on file     Attends meetings of clubs or organizations: Not on file     Relationship status: Not on file   Other Topics Concern    Are you pregnant or think you may be? Not Asked    Breast-feeding Not Asked   Social History Narrative    Not on file       Family History   Problem Relation Age of Onset    No Known Problems Mother     No Known Problems Father          OB History    Para Term  AB Living   2 2 2     2   SAB TAB Ectopic Multiple Live Births              "     # Outcome Date GA Lbr Silver/2nd Weight Sex Delivery Anes PTL Lv   2 Term      Vag-Spont      1 Term      Vag-Spont          COMPREHENSIVE GYN HISTORY:  PAP History: Denies abnormal Paps  Infection History: Denies STDs. Denies PID.  Benign History: Denies uterine fibroids. Denies ovarian cysts. Denies endometriosis Denies other conditions.  Cancer History: Denies cervical cancer. Denies uterine cancer or hyperplasia. Denies ovarian cancer. Denies vulvar cancer or pre-cancer. Denies vaginal cancer or pre-cancer. Denies breast cancer. Denies colon cancer.  Cycle: 8th grade/mon/4d      ROS:  GENERAL: Denies weight gain or weight loss. Feeling well overall.   SKIN: Denies rash or lesions.   HEAD: Denies headache.   NODES: Denies enlarged lymph nodes.   CHEST: Denies shortness of breath.   ABDOMEN: No abdominal pain, constipation, diarrhea, nausea, vomiting or rectal bleeding.   URINARY: No frequency, dysuria, hematuria, or burning on urination.  REPRODUCTIVE: See HPI.   BREASTS: The patient denies pain, lumps, or nipple discharge.       BP (!) 90/58   Ht 5' 5" (1.651 m)   Wt 55.2 kg (121 lb 12.8 oz)   BMI 20.27 kg/m²   APPEARANCE: Well nourished, well developed, in no acute distress.  NECK: Neck symmetric without  thyromegaly.  NODES: No inguinal, cervical lymph node enlargement.  CHEST: Lungs clear to auscultation.  HEART: Regular rate and rhythm, no murmurs, rubs or gallops.  ABDOMEN: Soft. No tenderness or masses. No hernias. No hepatosplenomegaly.  BREASTS: implants in place. Symmetrical, no skin changes or visible lesions. No palpable masses, nipple discharge or adenopathy bilaterally.  PELVIC:   VULVA: No lesions. Normal female genitalia.  URETHRAL MEATUS: Normal size and location, no lesions, no prolapse.  URETHRA: No masses, tenderness, prolapse or scarring.  VAGINA: Moist and well rugated, no discharge, no significant cystocele or rectocele.  CERVIX: No lesions and discharge.  UTERUS: Normal size, regular " shape, mobile, non-tender, bladder base nontender.  ADNEXA: No masses or tenderness.    Data Reviewed:     Lipid profile: Date:   Lab Results   Component Value Date    CHOL 174 10/13/2017     1. Encounter for annual routine gynecological examination    2. Pap smear for cervical cancer screening    3. Screening mammogram, encounter for        Plan:   1. Routine gyn annual exam. s/p normal breast exam and MMG ordered.  Pap with HPV cotesting ordered. STD testing: declined.           F/u in 1 yr or PRN

## 2020-02-17 LAB
HPV HR 12 DNA SPEC QL NAA+PROBE: NEGATIVE
HPV16 AG SPEC QL: NEGATIVE
HPV18 DNA SPEC QL NAA+PROBE: NEGATIVE

## 2020-02-20 ENCOUNTER — TELEPHONE (OUTPATIENT)
Dept: FAMILY MEDICINE | Facility: CLINIC | Age: 45
End: 2020-02-20

## 2020-02-20 NOTE — TELEPHONE ENCOUNTER
----- Message from Radha David sent at 2/20/2020  9:58 AM CST -----  Contact: Ms Bearden from [facility]  Ms Bearden from [facility] called to speak to the nurse regarding a referral for a mutual patient to pain management and would like a call back today at [phone] opt 2. Ms Bearden also would like to have clinical notes faxed to [phone] fax

## 2020-02-27 ENCOUNTER — HOSPITAL ENCOUNTER (OUTPATIENT)
Dept: RADIOLOGY | Facility: HOSPITAL | Age: 45
Discharge: HOME | End: 2020-02-27
Attending: OBSTETRICS & GYNECOLOGY
Payer: COMMERCIAL

## 2020-02-27 DIAGNOSIS — Z12.31 SCREENING MAMMOGRAM, ENCOUNTER FOR: ICD-10-CM

## 2020-02-27 PROCEDURE — 77067 SCR MAMMO BI INCL CAD: CPT | Mod: TC,PO

## 2020-03-10 ENCOUNTER — PATIENT MESSAGE (OUTPATIENT)
Dept: OBSTETRICS AND GYNECOLOGY | Facility: CLINIC | Age: 45
End: 2020-03-10

## 2020-03-10 LAB
FINAL PATHOLOGIC DIAGNOSIS: NORMAL
Lab: NORMAL

## 2020-05-29 ENCOUNTER — TELEPHONE (OUTPATIENT)
Dept: INTERNAL MEDICINE | Facility: CLINIC | Age: 45
End: 2020-05-29

## 2020-05-29 ENCOUNTER — OFFICE VISIT (OUTPATIENT)
Dept: FAMILY MEDICINE | Facility: CLINIC | Age: 45
End: 2020-05-29
Payer: COMMERCIAL

## 2020-05-29 DIAGNOSIS — M70.52 POPLITEAL BURSITIS OF LEFT KNEE: Primary | ICD-10-CM

## 2020-05-29 PROCEDURE — 99213 OFFICE O/P EST LOW 20 MIN: CPT | Mod: 95,,, | Performed by: FAMILY MEDICINE

## 2020-05-29 PROCEDURE — 99213 PR OFFICE/OUTPT VISIT, EST, LEVL III, 20-29 MIN: ICD-10-PCS | Mod: 95,,, | Performed by: FAMILY MEDICINE

## 2020-05-29 RX ORDER — METHYLPREDNISOLONE 4 MG/1
TABLET ORAL
Qty: 21 TABLET | Refills: 0 | Status: SHIPPED | OUTPATIENT
Start: 2020-05-29 | End: 2020-06-19

## 2020-05-29 NOTE — TELEPHONE ENCOUNTER
Patient was given instructions on how to download [employer]uh[employer]  She will call office for any additional questions.

## 2020-05-29 NOTE — PROGRESS NOTES
Subjective:       Patient ID: Geraldine Huerta is a 44 y.o. female.    The patient location is: home  The chief complaint leading to consultation is: baker's cyst    Visit type: audiovisual    Face to Face time with patient: 10   10 minutes of total time spent on the encounter, which includes face to face time and non-face to face time preparing to see the patient (eg, review of tests), Obtaining and/or reviewing separately obtained history, Documenting clinical information in the electronic or other health record, Independently interpreting results (not separately reported) and communicating results to the patient/family/caregiver, or Care coordination (not separately reported).         Each patient to whom he or she provides medical services by telemedicine is:  (1) informed of the relationship between the physician and patient and the respective role of any other health care provider with respect to management of the patient; and (2) notified that he or she may decline to receive medical services by telemedicine and may withdraw from such care at any time.    Notes:     Knee Pain    The incident occurred more than 1 week ago (runs daily.  started having pain and swelling in her knee was seen at urgent care and treated with steroids.  Knee improved, but worsened again when she started running again. ). The incident occurred at home. Injury mechanism: overuse. The pain is present in the left knee. The pain has been fluctuating since onset.     Review of Systems   Constitutional: Negative for activity change and unexpected weight change.   HENT: Negative for hearing loss, rhinorrhea and trouble swallowing.    Eyes: Negative for discharge and visual disturbance.   Respiratory: Negative for chest tightness and wheezing.    Cardiovascular: Negative for chest pain and palpitations.   Gastrointestinal: Positive for constipation. Negative for blood in stool, diarrhea and vomiting.   Endocrine: Negative for polydipsia and  polyuria.   Genitourinary: Negative for difficulty urinating, dysuria, hematuria and menstrual problem.   Musculoskeletal: Positive for arthralgias and joint swelling. Negative for neck pain.   Neurological: Negative for weakness and headaches.   Psychiatric/Behavioral: Negative for confusion and dysphoric mood.         Assessment:       1. Popliteal bursitis of left knee        Plan:       Popliteal bursitis of left knee  -     methylPREDNISolone (MEDROL DOSEPACK) 4 mg tablet; use as directed  Dispense: 21 tablet; Refill: 0  -     Ambulatory referral/consult to Physical/Occupational Therapy; Future; Expected date: 06/05/2020

## 2020-05-29 NOTE — TELEPHONE ENCOUNTER
----- Message from Juve Dale sent at 5/29/2020  1:28 PM CDT -----  Contact: [phone] / self   Patient would like to speak with your office regarding setting up for her virtual visit today at 4:20pm. Please Advise.

## 2020-08-21 ENCOUNTER — TELEPHONE (OUTPATIENT)
Dept: FAMILY MEDICINE | Facility: CLINIC | Age: 45
End: 2020-08-21

## 2020-08-21 DIAGNOSIS — M70.52 POPLITEAL BURSITIS OF LEFT KNEE: Primary | ICD-10-CM

## 2020-08-21 NOTE — TELEPHONE ENCOUNTER
Spoke with patient to schedule Ortho appt. Offered 8/28 with Dr. Licea in [place]. Patient declined and wants appt with [facility]. Informed will send message to alexandra the referral coordinator. Patient voices understanding.

## 2020-08-21 NOTE — TELEPHONE ENCOUNTER
Called and spoke with patient.  Has tried PT with no improvement.  Will place referral to ortho.

## 2020-08-21 NOTE — TELEPHONE ENCOUNTER
Please advise     ----- Message from Starla Dos Santos sent at 8/21/2020  2:03 PM CDT -----  Regarding: advice  Patient is requesting a call back. She has a baker's cyst behind both of her knees and would like to know if the cyst can be drained here. Please call her to discuss or schedule an appt with any doctor in the office who does.  Thanks

## 2021-04-14 DIAGNOSIS — Z12.31 OTHER SCREENING MAMMOGRAM: ICD-10-CM

## 2021-05-10 ENCOUNTER — PATIENT MESSAGE (OUTPATIENT)
Dept: RESEARCH | Facility: HOSPITAL | Age: 46
End: 2021-05-10

## 2021-07-06 ENCOUNTER — PATIENT MESSAGE (OUTPATIENT)
Dept: ADMINISTRATIVE | Facility: HOSPITAL | Age: 46
End: 2021-07-06

## 2021-07-07 ENCOUNTER — PATIENT MESSAGE (OUTPATIENT)
Dept: ADMINISTRATIVE | Facility: HOSPITAL | Age: 46
End: 2021-07-07

## 2021-10-04 ENCOUNTER — PATIENT MESSAGE (OUTPATIENT)
Dept: FAMILY MEDICINE | Facility: CLINIC | Age: 46
End: 2021-10-04

## 2022-01-10 ENCOUNTER — PATIENT MESSAGE (OUTPATIENT)
Dept: ADMINISTRATIVE | Facility: HOSPITAL | Age: 47
End: 2022-01-10
Payer: COMMERCIAL